# Patient Record
Sex: FEMALE | Race: WHITE | NOT HISPANIC OR LATINO | Employment: FULL TIME | URBAN - METROPOLITAN AREA
[De-identification: names, ages, dates, MRNs, and addresses within clinical notes are randomized per-mention and may not be internally consistent; named-entity substitution may affect disease eponyms.]

---

## 2017-09-18 ENCOUNTER — HOSPITAL ENCOUNTER (EMERGENCY)
Facility: HOSPITAL | Age: 27
Discharge: HOME | End: 2017-09-18
Admitting: EMERGENCY MEDICINE
Payer: COMMERCIAL

## 2017-09-18 VITALS
RESPIRATION RATE: 20 BRPM | WEIGHT: 145 LBS | SYSTOLIC BLOOD PRESSURE: 98 MMHG | TEMPERATURE: 96.9 F | HEART RATE: 93 BPM | HEIGHT: 68 IN | DIASTOLIC BLOOD PRESSURE: 53 MMHG | BODY MASS INDEX: 21.98 KG/M2 | OXYGEN SATURATION: 100 %

## 2017-09-18 DIAGNOSIS — R11.2 NAUSEA & VOMITING: ICD-10-CM

## 2017-09-18 DIAGNOSIS — O23.40 UTI (URINARY TRACT INFECTION) DURING PREGNANCY: Primary | ICD-10-CM

## 2017-09-18 LAB
ALBUMIN SERPL BCP-MCNC: 4 G/DL (ref 3.5–5)
ALP SERPL-CCNC: 23 U/L (ref 46–116)
ALT SERPL W P-5'-P-CCNC: 14 U/L (ref 12–78)
ANION GAP SERPL CALCULATED.3IONS-SCNC: 13 MMOL/L (ref 4–13)
AST SERPL W P-5'-P-CCNC: 13 U/L (ref 5–45)
B-HCG SERPL-ACNC: ABNORMAL MIU/ML
BACTERIA UR QL AUTO: ABNORMAL /HPF
BASOPHILS # BLD AUTO: 0.02 THOUSANDS/ΜL (ref 0–0.1)
BASOPHILS NFR BLD AUTO: 0 % (ref 0–1)
BILIRUB SERPL-MCNC: 0.4 MG/DL (ref 0.2–1)
BILIRUB UR QL STRIP: NEGATIVE
BUN SERPL-MCNC: 7 MG/DL (ref 5–25)
CALCIUM SERPL-MCNC: 8.8 MG/DL (ref 8.3–10.1)
CHLORIDE SERPL-SCNC: 104 MMOL/L (ref 100–108)
CLARITY UR: CLEAR
CO2 SERPL-SCNC: 23 MMOL/L (ref 21–32)
COLOR UR: YELLOW
CREAT SERPL-MCNC: 0.43 MG/DL (ref 0.6–1.3)
EOSINOPHIL # BLD AUTO: 0.1 THOUSAND/ΜL (ref 0–0.61)
EOSINOPHIL NFR BLD AUTO: 1 % (ref 0–6)
ERYTHROCYTE [DISTWIDTH] IN BLOOD BY AUTOMATED COUNT: 12.6 % (ref 11.6–15.1)
GFR SERPL CREATININE-BSD FRML MDRD: 141 ML/MIN/1.73SQ M
GLUCOSE SERPL-MCNC: 86 MG/DL (ref 65–140)
GLUCOSE UR STRIP-MCNC: NEGATIVE MG/DL
HCT VFR BLD AUTO: 38.6 % (ref 34.8–46.1)
HGB BLD-MCNC: 13.1 G/DL (ref 11.5–15.4)
HGB UR QL STRIP.AUTO: NEGATIVE
KETONES UR STRIP-MCNC: ABNORMAL MG/DL
LEUKOCYTE ESTERASE UR QL STRIP: ABNORMAL
LYMPHOCYTES # BLD AUTO: 1.56 THOUSANDS/ΜL (ref 0.6–4.47)
LYMPHOCYTES NFR BLD AUTO: 19 % (ref 14–44)
MCH RBC QN AUTO: 29.6 PG (ref 26.8–34.3)
MCHC RBC AUTO-ENTMCNC: 33.9 G/DL (ref 31.4–37.4)
MCV RBC AUTO: 87 FL (ref 82–98)
MONOCYTES # BLD AUTO: 0.52 THOUSAND/ΜL (ref 0.17–1.22)
MONOCYTES NFR BLD AUTO: 6 % (ref 4–12)
NEUTROPHILS # BLD AUTO: 6.01 THOUSANDS/ΜL (ref 1.85–7.62)
NEUTS SEG NFR BLD AUTO: 74 % (ref 43–75)
NITRITE UR QL STRIP: NEGATIVE
NON-SQ EPI CELLS URNS QL MICRO: ABNORMAL /HPF
PH UR STRIP.AUTO: 6 [PH] (ref 4.5–8)
PLATELET # BLD AUTO: 167 THOUSANDS/UL (ref 149–390)
PMV BLD AUTO: 10.9 FL (ref 8.9–12.7)
POTASSIUM SERPL-SCNC: 3.8 MMOL/L (ref 3.5–5.3)
PROT SERPL-MCNC: 7.5 G/DL (ref 6.4–8.2)
PROT UR STRIP-MCNC: NEGATIVE MG/DL
RBC # BLD AUTO: 4.42 MILLION/UL (ref 3.81–5.12)
RBC #/AREA URNS AUTO: ABNORMAL /HPF
SODIUM SERPL-SCNC: 140 MMOL/L (ref 136–145)
SP GR UR STRIP.AUTO: 1.02 (ref 1–1.03)
TSH SERPL DL<=0.05 MIU/L-ACNC: 1.94 UIU/ML (ref 0.36–3.74)
UROBILINOGEN UR QL STRIP.AUTO: 0.2 E.U./DL
WBC # BLD AUTO: 8.21 THOUSAND/UL (ref 4.31–10.16)
WBC #/AREA URNS AUTO: ABNORMAL /HPF

## 2017-09-18 PROCEDURE — 96374 THER/PROPH/DIAG INJ IV PUSH: CPT

## 2017-09-18 PROCEDURE — 84702 CHORIONIC GONADOTROPIN TEST: CPT | Performed by: PHYSICIAN ASSISTANT

## 2017-09-18 PROCEDURE — 84443 ASSAY THYROID STIM HORMONE: CPT | Performed by: PHYSICIAN ASSISTANT

## 2017-09-18 PROCEDURE — 36415 COLL VENOUS BLD VENIPUNCTURE: CPT | Performed by: PHYSICIAN ASSISTANT

## 2017-09-18 PROCEDURE — 85025 COMPLETE CBC W/AUTO DIFF WBC: CPT | Performed by: PHYSICIAN ASSISTANT

## 2017-09-18 PROCEDURE — 81001 URINALYSIS AUTO W/SCOPE: CPT | Performed by: PHYSICIAN ASSISTANT

## 2017-09-18 PROCEDURE — 80053 COMPREHEN METABOLIC PANEL: CPT | Performed by: PHYSICIAN ASSISTANT

## 2017-09-18 PROCEDURE — 99284 EMERGENCY DEPT VISIT MOD MDM: CPT

## 2017-09-18 PROCEDURE — 96361 HYDRATE IV INFUSION ADD-ON: CPT

## 2017-09-18 RX ORDER — CEPHALEXIN 500 MG/1
500 CAPSULE ORAL 2 TIMES DAILY
Qty: 9 CAPSULE | Refills: 0 | Status: SHIPPED | OUTPATIENT
Start: 2017-09-18 | End: 2017-09-23

## 2017-09-18 RX ORDER — METOCLOPRAMIDE HYDROCHLORIDE 5 MG/ML
10 INJECTION INTRAMUSCULAR; INTRAVENOUS ONCE
Status: COMPLETED | OUTPATIENT
Start: 2017-09-18 | End: 2017-09-18

## 2017-09-18 RX ORDER — CEPHALEXIN 250 MG/1
500 CAPSULE ORAL ONCE
Status: COMPLETED | OUTPATIENT
Start: 2017-09-18 | End: 2017-09-18

## 2017-09-18 RX ORDER — DOXYLAMINE SUCCINATE AND PYRIDOXINE HYDROCHLORIDE, DELAYED RELEASE TABLETS 10 MG/10 MG 10; 10 MG/1; MG/1
1 TABLET, DELAYED RELEASE ORAL 2 TIMES DAILY
Qty: 14 TABLET | Refills: 0 | Status: SHIPPED | OUTPATIENT
Start: 2017-09-18 | End: 2017-09-25

## 2017-09-18 RX ADMIN — CEPHALEXIN 500 MG: 250 CAPSULE ORAL at 15:00

## 2017-09-18 RX ADMIN — METOCLOPRAMIDE 10 MG: 5 INJECTION, SOLUTION INTRAMUSCULAR; INTRAVENOUS at 13:28

## 2017-09-18 RX ADMIN — SODIUM CHLORIDE 1000 ML: 0.9 INJECTION, SOLUTION INTRAVENOUS at 13:28

## 2017-09-20 ENCOUNTER — GENERIC CONVERSION - ENCOUNTER (OUTPATIENT)
Dept: OTHER | Facility: OTHER | Age: 27
End: 2017-09-20

## 2017-09-25 ENCOUNTER — HOSPITAL ENCOUNTER (OUTPATIENT)
Dept: INFUSION CENTER | Facility: CLINIC | Age: 27
Discharge: HOME | End: 2017-09-25
Payer: COMMERCIAL

## 2017-09-25 ENCOUNTER — ALLSCRIPTS OFFICE VISIT (OUTPATIENT)
Dept: OTHER | Facility: OTHER | Age: 27
End: 2017-09-25

## 2017-09-25 VITALS
DIASTOLIC BLOOD PRESSURE: 66 MMHG | TEMPERATURE: 98.3 F | SYSTOLIC BLOOD PRESSURE: 117 MMHG | HEART RATE: 91 BPM | RESPIRATION RATE: 18 BRPM

## 2017-09-25 PROCEDURE — 96361 HYDRATE IV INFUSION ADD-ON: CPT

## 2017-09-25 PROCEDURE — 96365 THER/PROPH/DIAG IV INF INIT: CPT

## 2017-09-25 RX ORDER — ONDANSETRON 4 MG/1
4 TABLET, FILM COATED ORAL ONCE
Status: DISCONTINUED | OUTPATIENT
Start: 2017-09-25 | End: 2017-09-25 | Stop reason: CLARIF

## 2017-09-25 RX ADMIN — ONDANSETRON: 2 INJECTION INTRAMUSCULAR; INTRAVENOUS at 11:10

## 2017-09-25 RX ADMIN — SODIUM CHLORIDE, SODIUM LACTATE, POTASSIUM CHLORIDE, AND CALCIUM CHLORIDE 1000 ML: .6; .31; .03; .02 INJECTION, SOLUTION INTRAVENOUS at 12:40

## 2017-09-25 NOTE — PROGRESS NOTES
Hydration and antiemetic given as ordered  Pt offers no complaints  Pt has no future appointments at this time

## 2017-09-26 ENCOUNTER — GENERIC CONVERSION - ENCOUNTER (OUTPATIENT)
Dept: OTHER | Facility: OTHER | Age: 27
End: 2017-09-26

## 2017-09-28 ENCOUNTER — GENERIC CONVERSION - ENCOUNTER (OUTPATIENT)
Dept: OTHER | Facility: OTHER | Age: 27
End: 2017-09-28

## 2017-09-29 ENCOUNTER — GENERIC CONVERSION - ENCOUNTER (OUTPATIENT)
Dept: OTHER | Facility: OTHER | Age: 27
End: 2017-09-29

## 2017-10-02 ENCOUNTER — GENERIC CONVERSION - ENCOUNTER (OUTPATIENT)
Dept: OTHER | Facility: OTHER | Age: 27
End: 2017-10-02

## 2017-10-03 ENCOUNTER — GENERIC CONVERSION - ENCOUNTER (OUTPATIENT)
Dept: OTHER | Facility: OTHER | Age: 27
End: 2017-10-03

## 2017-10-03 ENCOUNTER — ALLSCRIPTS OFFICE VISIT (OUTPATIENT)
Dept: OTHER | Facility: OTHER | Age: 27
End: 2017-10-03

## 2017-10-03 LAB
BACTERIA UR QL AUTO: NEGATIVE
CLUE CELL (HISTORICAL): NEGATIVE
HYPHAL YEAST (HISTORICAL): NORMAL
KOH PREP (HISTORICAL): NEGATIVE
PH UR STRIP.AUTO: 4 [PH]
TRICHOMONAS (HISTORICAL): NEGATIVE

## 2017-10-06 ENCOUNTER — GENERIC CONVERSION - ENCOUNTER (OUTPATIENT)
Dept: OTHER | Facility: OTHER | Age: 27
End: 2017-10-06

## 2017-10-07 LAB
ABO GROUP BLD: NORMAL
BASOPHILS # BLD AUTO: 0 %
BASOPHILS # BLD AUTO: 0 X10E3/UL (ref 0–0.2)
BLD GP AB SCN SERPL QL: NEGATIVE
DEPRECATED RDW RBC AUTO: 13.6 % (ref 12.3–15.4)
EOSINOPHIL # BLD AUTO: 0.1 X10E3/UL (ref 0–0.4)
EOSINOPHIL # BLD AUTO: 1 %
HCT VFR BLD AUTO: 35.5 % (ref 34–46.6)
HEPATITIS B SURFACE ANTIGEN (HISTORICAL): NEGATIVE
HGB BLD-MCNC: 12.3 G/DL (ref 11.1–15.9)
HIV-1/2 AB-AG (HISTORICAL): NON REACTIVE
IMM.GRANULOCYTES (CD4/8) (HISTORICAL): 0 %
IMM.GRANULOCYTES (CD4/8) (HISTORICAL): 0 X10E3/UL (ref 0–0.1)
LYMPHOCYTES # BLD AUTO: 1.5 X10E3/UL (ref 0.7–3.1)
LYMPHOCYTES # BLD AUTO: 23 %
MCH RBC QN AUTO: 30.2 PG (ref 26.6–33)
MCHC RBC AUTO-ENTMCNC: 34.6 G/DL (ref 31.5–35.7)
MCV RBC AUTO: 87 FL (ref 79–97)
MONOCYTES # BLD AUTO: 0.4 X10E3/UL (ref 0.1–0.9)
MONOCYTES (HISTORICAL): 6 %
NEUTROPHILS # BLD AUTO: 4.4 X10E3/UL (ref 1.4–7)
NEUTROPHILS # BLD AUTO: 70 %
PLATELET # BLD AUTO: 165 X10E3/UL (ref 150–379)
RBC (HISTORICAL): 4.07 X10E6/UL (ref 3.77–5.28)
RH BLD: POSITIVE
RPR SCREEN (HISTORICAL): NON REACTIVE
RUBELLA, IGG (HISTORICAL): 2.68 INDEX
WBC # BLD AUTO: 6.4 X10E3/UL (ref 3.4–10.8)

## 2017-10-09 ENCOUNTER — GENERIC CONVERSION - ENCOUNTER (OUTPATIENT)
Dept: OTHER | Facility: OTHER | Age: 27
End: 2017-10-09

## 2017-10-09 ENCOUNTER — HOSPITAL ENCOUNTER (EMERGENCY)
Facility: HOSPITAL | Age: 27
Discharge: HOME | End: 2017-10-09
Payer: COMMERCIAL

## 2017-10-09 VITALS
SYSTOLIC BLOOD PRESSURE: 98 MMHG | BODY MASS INDEX: 22.05 KG/M2 | TEMPERATURE: 98 F | RESPIRATION RATE: 16 BRPM | HEART RATE: 81 BPM | OXYGEN SATURATION: 98 % | DIASTOLIC BLOOD PRESSURE: 52 MMHG | WEIGHT: 145 LBS

## 2017-10-09 DIAGNOSIS — O21.0 HYPEREMESIS GRAVIDARUM: Primary | ICD-10-CM

## 2017-10-09 LAB
ALBUMIN SERPL BCP-MCNC: 4 G/DL (ref 3.5–5)
ALP SERPL-CCNC: 22 U/L (ref 46–116)
ALT SERPL W P-5'-P-CCNC: 21 U/L (ref 12–78)
AMORPH URATE CRY URNS QL MICRO: ABNORMAL /HPF
ANION GAP SERPL CALCULATED.3IONS-SCNC: 8 MMOL/L (ref 4–13)
AST SERPL W P-5'-P-CCNC: 14 U/L (ref 5–45)
BACTERIA UR QL AUTO: ABNORMAL /HPF
BASOPHILS # BLD AUTO: 0.03 THOUSANDS/ΜL (ref 0–0.1)
BASOPHILS NFR BLD AUTO: 0 % (ref 0–1)
BILIRUB SERPL-MCNC: 0.3 MG/DL (ref 0.2–1)
BILIRUB UR QL STRIP: NEGATIVE
BUN SERPL-MCNC: 8 MG/DL (ref 5–25)
CALCIUM SERPL-MCNC: 8.9 MG/DL (ref 8.3–10.1)
CHLORIDE SERPL-SCNC: 100 MMOL/L (ref 100–108)
CLARITY UR: ABNORMAL
CO2 SERPL-SCNC: 27 MMOL/L (ref 21–32)
COLOR UR: YELLOW
CREAT SERPL-MCNC: 0.54 MG/DL (ref 0.6–1.3)
CULTURE RESULT (HISTORICAL): NORMAL
EOSINOPHIL # BLD AUTO: 0.1 THOUSAND/ΜL (ref 0–0.61)
EOSINOPHIL NFR BLD AUTO: 1 % (ref 0–6)
ERYTHROCYTE [DISTWIDTH] IN BLOOD BY AUTOMATED COUNT: 12.8 % (ref 11.6–15.1)
FINE GRAN CASTS URNS QL MICRO: ABNORMAL /LPF
GFR SERPL CREATININE-BSD FRML MDRD: 131 ML/MIN/1.73SQ M
GLUCOSE SERPL-MCNC: 78 MG/DL (ref 65–140)
GLUCOSE UR STRIP-MCNC: NEGATIVE MG/DL
HCT VFR BLD AUTO: 37.3 % (ref 34.8–46.1)
HGB BLD-MCNC: 12.8 G/DL (ref 11.5–15.4)
HGB UR QL STRIP.AUTO: NEGATIVE
KETONES UR STRIP-MCNC: ABNORMAL MG/DL
LEUKOCYTE ESTERASE UR QL STRIP: NEGATIVE
LYMPHOCYTES # BLD AUTO: 1.64 THOUSANDS/ΜL (ref 0.6–4.47)
LYMPHOCYTES NFR BLD AUTO: 24 % (ref 14–44)
MAGNESIUM SERPL-MCNC: 2 MG/DL (ref 1.6–2.6)
MCH RBC QN AUTO: 30.1 PG (ref 26.8–34.3)
MCHC RBC AUTO-ENTMCNC: 34.3 G/DL (ref 31.4–37.4)
MCV RBC AUTO: 88 FL (ref 82–98)
MISCELLANEOUS LAB TEST RESULT (HISTORICAL): NORMAL
MONOCYTES # BLD AUTO: 0.55 THOUSAND/ΜL (ref 0.17–1.22)
MONOCYTES NFR BLD AUTO: 8 % (ref 4–12)
MUCOUS THREADS UR QL AUTO: ABNORMAL
NEUTROPHILS # BLD AUTO: 4.67 THOUSANDS/ΜL (ref 1.85–7.62)
NEUTS SEG NFR BLD AUTO: 67 % (ref 43–75)
NITRITE UR QL STRIP: NEGATIVE
NON-SQ EPI CELLS URNS QL MICRO: ABNORMAL /HPF
PH UR STRIP.AUTO: 6.5 [PH] (ref 4.5–8)
PLATELET # BLD AUTO: 162 THOUSANDS/UL (ref 149–390)
PMV BLD AUTO: 11.7 FL (ref 8.9–12.7)
POTASSIUM SERPL-SCNC: 3.5 MMOL/L (ref 3.5–5.3)
PROT SERPL-MCNC: 7.6 G/DL (ref 6.4–8.2)
PROT UR STRIP-MCNC: ABNORMAL MG/DL
RBC # BLD AUTO: 4.25 MILLION/UL (ref 3.81–5.12)
RBC #/AREA URNS AUTO: ABNORMAL /HPF
SODIUM SERPL-SCNC: 135 MMOL/L (ref 136–145)
SP GR UR STRIP.AUTO: 1.01 (ref 1–1.03)
UROBILINOGEN UR QL STRIP.AUTO: 0.2 E.U./DL
WBC # BLD AUTO: 6.99 THOUSAND/UL (ref 4.31–10.16)
WBC #/AREA URNS AUTO: ABNORMAL /HPF

## 2017-10-09 PROCEDURE — 99284 EMERGENCY DEPT VISIT MOD MDM: CPT

## 2017-10-09 PROCEDURE — 96361 HYDRATE IV INFUSION ADD-ON: CPT

## 2017-10-09 PROCEDURE — 83735 ASSAY OF MAGNESIUM: CPT | Performed by: PHYSICIAN ASSISTANT

## 2017-10-09 PROCEDURE — 36415 COLL VENOUS BLD VENIPUNCTURE: CPT | Performed by: PHYSICIAN ASSISTANT

## 2017-10-09 PROCEDURE — 81001 URINALYSIS AUTO W/SCOPE: CPT | Performed by: PHYSICIAN ASSISTANT

## 2017-10-09 PROCEDURE — 85025 COMPLETE CBC W/AUTO DIFF WBC: CPT | Performed by: PHYSICIAN ASSISTANT

## 2017-10-09 PROCEDURE — 80053 COMPREHEN METABOLIC PANEL: CPT | Performed by: PHYSICIAN ASSISTANT

## 2017-10-09 PROCEDURE — 96374 THER/PROPH/DIAG INJ IV PUSH: CPT

## 2017-10-09 RX ORDER — PROMETHAZINE HYDROCHLORIDE 25 MG/1
25 SUPPOSITORY RECTAL EVERY 6 HOURS PRN
Qty: 20 EACH | Refills: 0 | Status: ON HOLD | OUTPATIENT
Start: 2017-10-09 | End: 2018-04-15 | Stop reason: ALTCHOICE

## 2017-10-09 RX ORDER — ONDANSETRON HYDROCHLORIDE 8 MG/1
TABLET, FILM COATED ORAL EVERY 12 HOURS PRN
COMMUNITY

## 2017-10-09 RX ORDER — METOCLOPRAMIDE HYDROCHLORIDE 5 MG/ML
10 INJECTION INTRAMUSCULAR; INTRAVENOUS ONCE
Status: COMPLETED | OUTPATIENT
Start: 2017-10-09 | End: 2017-10-09

## 2017-10-09 RX ADMIN — METOCLOPRAMIDE 10 MG: 5 INJECTION, SOLUTION INTRAMUSCULAR; INTRAVENOUS at 17:30

## 2017-10-09 RX ADMIN — SODIUM CHLORIDE 1000 ML: 0.9 INJECTION, SOLUTION INTRAVENOUS at 16:15

## 2017-10-09 NOTE — DISCHARGE INSTRUCTIONS
Hyperemesis Gravidarum   WHAT YOU NEED TO KNOW:   Hyperemesis gravidarum is a severe form of nausea and vomiting that happens during pregnancy  Hyperemesis is more severe than morning sickness  It may cause you to have nausea or vomiting all day for many days  It may also keep you from getting enough food and liquid  DISCHARGE INSTRUCTIONS:   Return to the emergency department if:   · You have signs of severe dehydration including little to no urine and dry mouth or lips  · You have severe stomach pain  · You feel too weak or dizzy to stand up  · You see blood in your vomit or bowel movements  Contact your healthcare provider if:   · You cannot keep any food or liquid down  · You are losing weight  · You have a fever  · You have questions or concerns about your condition or care  Medicines:   · Medicines, vitamins, or supplements  may be given to help decrease nausea and vomiting  · Take your medicine as directed  Contact your healthcare provider if you think your medicine is not helping or if you have side effects  Tell him of her if you are allergic to any medicine  Keep a list of the medicines, vitamins, and herbs you take  Include the amounts, and when and why you take them  Bring the list or the pill bottles to follow-up visits  Carry your medicine list with you in case of an emergency  Manage your symptoms:   · Eat small amounts of food every 1 to 2 hours  Some examples of good foods to eat include broth, toast, crackers, fruit, eggs, gelatin, or cottage cheese  Do not eat spicy or high-fat foods  Foods and drinks with ginger, such as ginger ale, may help to decrease nausea and vomiting  · Drink liquids as directed  You may need to drink small amounts of liquid often to prevent dehydration  Ask how much liquid to drink each day and which liquids are best for you  · Rest when you need to    Start activity slowly and work up to your usual routine as you start to feel better  · Medicines, vitamins, or supplements  may be given to help decrease nausea and vomiting  · Weigh yourself daily if directed by your healthcare provider  You may need to keep a record of your daily weights for your healthcare provider  He may want to make sure you are not losing too much weight  Follow up with your healthcare provider as directed:  Write down your questions so you remember to ask them during your visits  © 2017 [address] [employer] Information is for End User's use only and may not be sold, redistributed or otherwise used for commercial purposes  All illustrations and images included in CareNotes® are the copyrighted property of A D A M , Inc  or Gil Meraz  The above information is an  only  It is not intended as medical advice for individual conditions or treatments  Talk to your doctor, nurse or pharmacist before following any medical regimen to see if it is safe and effective for you

## 2017-10-09 NOTE — ED PROVIDER NOTES
History  Chief Complaint   Patient presents with    Hyperemesis Gravidarum     pt stated that Monalisa Steel is not working, OB sent her to the ER for fluids     67-year-old female presents for evaluation of emesis  Patient is currently 12 weeks pregnant  This is her 1st pregnancy  No other complications other than the persistent nausea and vomiting  Patient was seen here 3 weeks ago for the same complaint  She says that she has been following up with her obstetrician/gynecologist   She says there has been no change  Initially she was placed on 8 milligrams 3 times a day of dissolvable Zofran  This actually helped with her symptoms  She says that her insurance however does not cover for enough tablets and when she was switched to the pill form of the Zofran it no longer is working  She called her obstetrician/gynecologist office and they told her to come to the emergency department for concern for dehydration or electrolyte abnormalities  She denies any other symptoms such as fevers, chills, headaches, visual changes, chest pain, shortness of breath, abdominal pain, diarrhea, melena, hematochezia, dysuria, hematuria, urgency or frequency  She is otherwise healthy with no other complaints at this time  Prior to Admission Medications   Prescriptions Last Dose Informant Patient Reported? Taking? Prenatal Vit-Fe Fumarate-FA (PRENATAL 1+1 PO) 10/9/2017 at Unknown time  Yes Yes   Sig: Take 1 tablet by mouth daily   ondansetron (ZOFRAN) 8 mg tablet   Yes Yes   Sig: Take by mouth every 12 (twelve) hours as needed for nausea or vomiting      Facility-Administered Medications: None       History reviewed  No pertinent past medical history  Past Surgical History:   Procedure Laterality Date    NASAL SINUS SURGERY      WISDOM TOOTH EXTRACTION      LEFT LOWER REMOVED       History reviewed  No pertinent family history  I have reviewed and agree with the history as documented      Social History   Substance Use Topics    Smoking status: Never Smoker    Smokeless tobacco: Never Used    Alcohol use No        Review of Systems   Constitutional: Negative for activity change, appetite change, chills, fatigue and fever  HENT: Negative for congestion, postnasal drip, rhinorrhea, sore throat, trouble swallowing and voice change  Eyes: Negative for photophobia and visual disturbance  Respiratory: Negative for cough and shortness of breath  Cardiovascular: Negative for chest pain  Gastrointestinal: Positive for nausea and vomiting  Negative for abdominal pain, constipation and diarrhea  Endocrine: Negative for cold intolerance and heat intolerance  Genitourinary: Negative for dysuria, flank pain, frequency, hematuria and urgency  Musculoskeletal: Negative for back pain, gait problem, neck pain and neck stiffness  Skin: Negative for rash and wound  Allergic/Immunologic: Negative for food allergies  Neurological: Positive for weakness  Negative for dizziness, light-headedness, numbness and headaches  Hematological: Negative for adenopathy  Physical Exam  ED Triage Vitals   Temperature Pulse Respirations Blood Pressure SpO2   10/09/17 1616 10/09/17 1616 10/09/17 1616 10/09/17 1616 10/09/17 1617   98 °F (36 7 °C) 85 16 105/68 100 %      Temp Source Heart Rate Source Patient Position - Orthostatic VS BP Location FiO2 (%)   10/09/17 1616 10/09/17 1616 10/09/17 1616 10/09/17 1616 --   Temporal Monitor Sitting Right arm       Pain Score       10/09/17 1616       No Pain           Physical Exam   Constitutional: She is oriented to person, place, and time  She appears well-developed and well-nourished  No distress  HENT:   Head: Normocephalic and atraumatic  Eyes: Conjunctivae and EOM are normal  Pupils are equal, round, and reactive to light  Neck: Normal range of motion  Neck supple  Cardiovascular: Normal rate, regular rhythm, normal heart sounds and intact distal pulses    Exam reveals no gallop and no friction rub  No murmur heard  Pulmonary/Chest: Effort normal and breath sounds normal  No respiratory distress  She has no wheezes  She has no rales  She exhibits no tenderness  Abdominal: Soft  There is no tenderness  Musculoskeletal: Normal range of motion  She exhibits no edema, tenderness or deformity  Neurological: She is alert and oriented to person, place, and time  Skin: Skin is warm and dry  Capillary refill takes less than 2 seconds  She is not diaphoretic  No pallor  Psychiatric: She has a normal mood and affect  Her behavior is normal  Judgment and thought content normal    Vitals reviewed  ED Medications  Medications   sodium chloride 0 9 % bolus 1,000 mL (0 mL Intravenous Stopped 10/9/17 1728)   metoclopramide (REGLAN) injection 10 mg (10 mg Intravenous Given 10/9/17 1730)       Diagnostic Studies  Labs Reviewed   COMPREHENSIVE METABOLIC PANEL - Abnormal        Result Value Ref Range Status    Sodium 135 (*) 136 - 145 mmol/L Final    Creatinine 0 54 (*) 0 60 - 1 30 mg/dL Final    Comment: Standardized to IDMS reference method    Alkaline Phosphatase 22 (*) 46 - 116 U/L Final    Potassium 3 5  3 5 - 5 3 mmol/L Final    Chloride 100  100 - 108 mmol/L Final    CO2 27  21 - 32 mmol/L Final    Anion Gap 8  4 - 13 mmol/L Final    BUN 8  5 - 25 mg/dL Final    Glucose 78  65 - 140 mg/dL Final    Comment:   If the patient is fasting, the ADA then defines impaired fasting glucose as > 100 mg/dL and diabetes as > or equal to 123 mg/dL  Specimen collection should occur prior to Sulfasalazine administration due to the potential for falsely depressed results  Specimen collection should occur prior to Sulfapyridine administration due to the potential for falsely elevated results  Calcium 8 9  8 3 - 10 1 mg/dL Final    AST 14  5 - 45 U/L Final    Comment:   Specimen collection should occur prior to Sulfasalazine administration due to the potential for falsely depressed results  ALT 21  12 - 78 U/L Final    Comment:   Specimen collection should occur prior to Sulfasalazine administration due to the potential for falsely depressed results  Total Protein 7 6  6 4 - 8 2 g/dL Final    Albumin 4 0  3 5 - 5 0 g/dL Final    Total Bilirubin 0 30  0 20 - 1 00 mg/dL Final    eGFR 131  ml/min/1 73sq m Final    Narrative:     National Kidney Disease Education Program recommendations are as follows:  GFR calculation is accurate only with a steady state creatinine  Chronic Kidney disease less than 60 ml/min/1 73 sq  meters  Kidney failure less than 15 ml/min/1 73 sq  meters     UA W REFLEX TO MICROSCOPIC WITH REFLEX TO CULTURE - Abnormal     Protein, UA 30 (1+) (*) Negative mg/dl Final    Ketones, UA Trace (*) Negative mg/dl Final    Color, UA Yellow   Final    Clarity, UA Cloudy   Final    Specific [place], UA 1 015  1 003 - 1 030 Final    pH, UA 6 5  4 5 - 8 0 Final    Leukocytes, UA Negative  Negative Final    Nitrite, UA Negative  Negative Final    Glucose, UA Negative  Negative mg/dl Final    Urobilinogen, UA 0 2  0 2, 1 0 E U /dl E U /dl Final    Bilirubin, UA Negative  Negative Final    Blood, UA Negative  Negative Final   URINE MICROSCOPIC - Abnormal     WBC, UA 0-1 (*) None Seen, 0-5, 5-55, 5-65 /hpf Final    RBC, UA None Seen  None Seen, 0-5 /hpf Final    Epithelial Cells Occasional  None Seen, Occasional /hpf Final    Bacteria, UA Occasional  None Seen, Occasional /hpf Final    Fine granular casts 1-2  /lpf Final    AMORPH URATES Moderate  /hpf Final    MUCOUS THREADS Occasional  Occasional, Moderate, Innumerable Final   CBC AND DIFFERENTIAL - Normal    WBC 6 99  4 31 - 10 16 Thousand/uL Final    RBC 4 25  3 81 - 5 12 Million/uL Final    Hemoglobin 12 8  11 5 - 15 4 g/dL Final    Hematocrit 37 3  34 8 - 46 1 % Final    MCV 88  82 - 98 fL Final    MCH 30 1  26 8 - 34 3 pg Final    MCHC 34 3  31 4 - 37 4 g/dL Final    RDW 12 8  11 6 - 15 1 % Final    MPV 11 7  8 9 - 12 7 fL Final Platelets 249  994 - 390 Thousands/uL Final    Neutrophils Relative 67  43 - 75 % Final    Lymphocytes Relative 24  14 - 44 % Final    Monocytes Relative 8  4 - 12 % Final    Eosinophils Relative 1  0 - 6 % Final    Basophils Relative 0  0 - 1 % Final    Neutrophils Absolute 4 67  1 85 - 7 62 Thousands/µL Final    Lymphocytes Absolute 1 64  0 60 - 4 47 Thousands/µL Final    Monocytes Absolute 0 55  0 17 - 1 22 Thousand/µL Final    Eosinophils Absolute 0 10  0 00 - 0 61 Thousand/µL Final    Basophils Absolute 0 03  0 00 - 0 10 Thousands/µL Final   MAGNESIUM - Normal    Magnesium 2 0  1 6 - 2 6 mg/dL Final       No orders to display       Procedures  Procedures      Phone Contacts  ED Phone Contact    ED Course  ED Course                                MDM  Number of Diagnoses or Management Options  Hyperemesis gravidarum:   Diagnosis management comments: Differential dx includes but is not limited to hyperemesis gravidarum, UTI, gastritis  At this time pt without abdominal pain  12 weeks pregnant  No spotting or complications  She is here for nausea and vomiting  Sent by Ob/Gyn due to concern for dehydration, electrolyte abnormality  Action: CBC, CMP, UA, fluids, reglan  Results: normal labs  Reglan provided some relief  Pt tolerating PO  Plan: d/c home at this time with phenergan suppository and follow up with her ob/gyn for further intervention and management  Amount and/or Complexity of Data Reviewed  Clinical lab tests: ordered and reviewed  Review and summarize past medical records: yes  Discuss the patient with other providers: yes  Independent visualization of images, tracings, or specimens: yes      CritCare Time    Disposition  Final diagnoses:   Hyperemesis gravidarum     ED Disposition     ED Disposition Condition Comment    Discharge  [facility] discharge to home/self care      Condition at discharge: Good        Follow-up Information     Follow up With Specialties Details Why Contact Info Additional Information    [facility] Emergency Department Emergency Medicine Go to If symptoms worsen [address] [facility]   [address], [place] [address], [place], [place], [address]    Your Ob/Gyn  Call tomorrow for follow up           Patient's Medications   Discharge Prescriptions    PROMETHAZINE (PHENERGAN) 25 MG SUPPOSITORY    Insert 1 suppository into the rectum every 6 (six) hours as needed for nausea or vomiting       Start Date: 10/9/2017 End Date: --       Order Dose: 25 mg       Quantity: 20 each    Refills: 0     No discharge procedures on file      ED Provider  Electronically Signed by       Francisca Doty PA-C  10/09/17 2069

## 2017-10-10 ENCOUNTER — GENERIC CONVERSION - ENCOUNTER (OUTPATIENT)
Dept: OTHER | Facility: OTHER | Age: 27
End: 2017-10-10

## 2017-10-12 LAB
ADEQUACY: (HISTORICAL): NORMAL
CHLAMYDIA DFA, NAA OR PCR (HISTORICAL): NEGATIVE
CLINICIAN PROVIDIED ICD 9 OR 10 (HISTORICAL): NORMAL
COMMENT (HISTORICAL): NORMAL
COMMENT (HISTORICAL): NORMAL
CYSTIC FIBROSIS SCREEN (HISTORICAL): NORMAL
DIAGNOSIS (HISTORICAL): NORMAL
GC, DNA PROB (HISTORICAL): NEGATIVE
HPV HIGH RISK (HISTORICAL): NEGATIVE
NOTE: (HISTORICAL): NORMAL
PDF IMAGE (HISTORICAL): NORMAL
PERFORMED BY (HISTORICAL): NORMAL
TEST INFORMATION (HISTORICAL): NORMAL

## 2017-10-17 ENCOUNTER — GENERIC CONVERSION - ENCOUNTER (OUTPATIENT)
Dept: OTHER | Facility: OTHER | Age: 27
End: 2017-10-17

## 2017-10-27 NOTE — PROGRESS NOTES
Assessment  1  Hyperemesis of pregnancy (643 00) (O21 0)    Plan  Hyperemesis of pregnancy    · Ondansetron 4 MG Oral Tablet Disintegrating; TAKE 1 TABLET EVERY 6 TO 8  HOURS AS NEEDED   Rx By: Jessica Decker; Dispense: 8 Days ; #:1 Tablet Disintegrating; Refill: 2;For: Hyperemesis of pregnancy; DAMEON = N; Sent To: [facility]   · Follow-up visit in 2 weeks Evaluation and Treatment  Follow-up  Status: Hold For -  Scheduling  Requested for: [id number]   Ordered; For: Hyperemesis of pregnancy; Ordered By: Jessica Decker Performed:  Due: [id number]    Discussion/Summary  Discussion Summary:   1  Hyperemesis? patient with LMP of 7/16/17 placing her at 10 weeks gestation, confirmed by ultrasound at [facility] at 8 5 weeks on 9/15 with ENEDELIA of 4/22/17  The patient had a picture which she showed me with apparently viable pregnancy  She has had a weight loss from 153 down to 143  She called her primary OB/GYN doctor through [facility] and the [facility] office and she was not happy with their lack of response to her condition  She has been seen in the [facility] ER and the [facility] ER  She did appear to be pale and weak today  She has had lack of response to Diclegis as prescribed previously at the [place] [address] ER  She was counseled about Zofran and prescription for 4 mg orally dissolving tablets #30 to take one tablet every 6-8 hours time refill 2 was sent to local pharmacy  She will call with any significant persistent symptoms, particularly with protracted nausea and vomiting associated with lightheadedness or dizziness  Additionally, she was sent to the infusion center today for IV fluids, 1 5 L of LR and 1 L of normal saline with Zofran 4 mg to see if this will help her  We will keep in contact with her closely  Prenatal?patient to follow-up next few weeks for first prenatal visit  Possible UTI? patient was treated in the ER for UTI  She was encouraged to finish the antibiotics as prescribed  will follow-up in 2-3 weeks' time for first prenatal visit  Counseling Documentation With Imm: total time of encounter was 40 minutes-- and-- 40 minutes was spent counseling  Goals and Barriers: The patient has the current Goals: Reviewed  The patent has the current Barriers: None  Patient's Capacity to Self-Care: Patient is able to Self-Care  Medication SE Review and Pt Understands Tx: Possible side effects of new medications were reviewed with the patient/guardian today  The treatment plan was reviewed with the patient/guardian  The patient/guardian understands and agrees with the treatment plan   Self Referrals:   Self Referrals: Yes      Chief Complaint  Chief Complaint Free Text Note Form: NEW OB, ENEDELIA 04/22/2017, PT C/O EXCESSIVE VOMITING      History of Present Illness  HPI: The patient was seen today for new patient evaluation  Please see assessment and plan for details  Review of Systems  Focused-Female:   Constitutional: No fever, no chills, feels well, no tiredness, no recent weight gain or loss  ENT: no ear ache, no loss of hearing, no nosebleeds or nasal discharge, no sore throat or hoarseness  Cardiovascular: no complaints of slow or fast heart rate, no chest pain, no palpitations, no leg claudication or lower extremity edema  Respiratory: no complaints of shortness of breath, no wheezing, no dyspnea on exertion, no orthopnea or PND  Breasts: no complaints of breast pain, breast lump or nipple discharge  Gastrointestinal: nausea-- and-- vomiting, but-- as noted in HPI  Genitourinary: no complaints of dysuria, no incontinence, no pelvic pain, no dysmenorrhea, no vaginal discharge or abnormal vaginal bleeding  Musculoskeletal: no complaints of arthralgia, no myalgia, no joint swelling or stiffness, no limb pain or swelling  Integumentary: no complaints of skin rash or lesion, no itching or dry skin, no skin wounds     Neurological: no complaints of headache, no confusion, no numbness or tingling, no dizziness or fainting  ROS Reviewed:   ROS reviewed  Active Problems  1  Hyperemesis of pregnancy (643 00) (O21 0)    Past Medical History  1  History of  1  Active Problems And Past Medical History Reviewed: The active problems and past medical history were reviewed and updated today  Surgical History  1  History of Oral Surgery Tooth Extraction Frannie Tooth   2  History of Sinus Surgery  Surgical History Reviewed: The surgical history was reviewed and updated today  Family History  Family History Reviewed: The family history was reviewed and updated today  Social History  Social History Reviewed: The social history was reviewed and updated today  The social history was reviewed and is unchanged  Current Meds   1  Diclegis 10-10 MG Oral Tablet Delayed Release; Therapy: (Recorded:[id number]) to Recorded   2  Prenatal TABS; Therapy: (Recorded:80Nos6524) to Recorded  Medication List Reviewed: The medication list was reviewed and updated today  Allergies  1  No Known Drug Allergies    Vitals  Vital Signs    Recorded: [id number] 71:90YS   Systolic 98, LUE, Sitting   Diastolic 56, LUE, Sitting   Height 5 ft 8 in   Weight 143 lb 6 oz   BMI Calculated 21 8   BSA Calculated 1 77   LMP [id number]     Physical Exam    Constitutional   General appearance: Abnormal  -- Patient somewhat pelvic and weak  Abdomen   Abdomen: Normal, non-tender, and no organomegaly noted  -- Soft, nontender     Psychiatric   Orientation to person, place, and time: Normal     Mood and affect: Normal        Signatures   Electronically signed by : TIFFANY Carter ; Sep 25 2017  1:10PM EST                       (Author)    Electronically signed by : TIFFANY Carter ; Sep 25 2017  1:13PM EST                       (Author)

## 2017-10-31 ENCOUNTER — GENERIC CONVERSION - ENCOUNTER (OUTPATIENT)
Dept: OTHER | Facility: OTHER | Age: 27
End: 2017-10-31

## 2017-11-28 ENCOUNTER — GENERIC CONVERSION - ENCOUNTER (OUTPATIENT)
Dept: OTHER | Facility: OTHER | Age: 27
End: 2017-11-28

## 2017-12-05 ENCOUNTER — ALLSCRIPTS OFFICE VISIT (OUTPATIENT)
Dept: PERINATAL CARE | Facility: CLINIC | Age: 27
End: 2017-12-05
Payer: COMMERCIAL

## 2017-12-05 ENCOUNTER — GENERIC CONVERSION - ENCOUNTER (OUTPATIENT)
Dept: OTHER | Facility: OTHER | Age: 27
End: 2017-12-05

## 2017-12-05 PROCEDURE — 76817 TRANSVAGINAL US OBSTETRIC: CPT | Performed by: OBSTETRICS & GYNECOLOGY

## 2017-12-05 PROCEDURE — 76805 OB US >/= 14 WKS SNGL FETUS: CPT | Performed by: OBSTETRICS & GYNECOLOGY

## 2017-12-22 ENCOUNTER — GENERIC CONVERSION - ENCOUNTER (OUTPATIENT)
Dept: OTHER | Facility: OTHER | Age: 27
End: 2017-12-22

## 2018-01-02 ENCOUNTER — ALLSCRIPTS OFFICE VISIT (OUTPATIENT)
Dept: OTHER | Facility: OTHER | Age: 28
End: 2018-01-02

## 2018-01-11 NOTE — MISCELLANEOUS
Message   Date: 26 Sep 2017 9:41 AM EST, Recorded By: Hallie Bateman For: Casey Del Angelast: Lenny Bradley, [place]   Phone: [phone] (Home)   Reason: Medical Complaint   Patient called for work note for appointment yesterday  Mailed per request         Active Problems    1  Hyperemesis of pregnancy (643 00) (O21 0)    Current Meds   1  Diclegis 10-10 MG Oral Tablet Delayed Release; Therapy: (Recorded:41Hxk6614) to Recorded   2  Ondansetron 4 MG Oral Tablet Disintegrating; TAKE 1 TABLET EVERY 6 TO 8 HOURS   AS NEEDED; Therapy: 09GQM0102 to 77 873 135)  Requested for: 73LWJ5142; Last   Rx:[id number] Ordered   3  Prenatal TABS; Therapy: (Recorded:93Eow1501) to Recorded    Allergies    1   No Known Drug Allergies    Signatures   Electronically signed by : Eligio Garcia, ; Sep 26 2017  9:42AM EST                       (Author)

## 2018-01-11 NOTE — MISCELLANEOUS
Message   Date: 02 Oct 2017 3:32 PM EST, Recorded By: Cecelia Jiang For: Shade Oshea, Self   Phone: [phone] (Home)   Reason: Medical Complaint   pt is on the Zofran 8 mg and is still vomiting, but not as much    she has a lot of dry heaving    pt has an apt tomorrow and will be evaluated    meanwhile advised pt to have small amounts of water frequently, suck on saltines, lemon drops            Active Problems    1  Hyperemesis of pregnancy (643 00) (O21 0)    Current Meds   1  Diclegis 10-10 MG Oral Tablet Delayed Release; Therapy: (Recorded:89Xqp0583) to Recorded   2  Ondansetron 4 MG Oral Tablet Disintegrating; TAKE 1 TABLET EVERY 6 TO 8 HOURS   AS NEEDED; Therapy: 67YYR6996 to 40-45-11-94)  Requested for: 05AQB7963; Last   Rx:[id number] Ordered   3  Ondansetron 8 MG Oral Tablet Disintegrating; 1 tab PO q 8 hrs prn; Therapy: 83DWV0653 to (Last Luis Manuel Rea)  Requested for: [id number] Ordered   4  Prenatal TABS; Therapy: (Recorded:09Pjo6610) to Recorded    Allergies    1   No Known Drug Allergies    Signatures   Electronically signed by : Dannie Sampson, ; Oct  2 2017  3:36PM EST                       (Author)

## 2018-01-11 NOTE — PROGRESS NOTES
Plan  Hyperemesis of pregnancy, Pregnancy, obstetrical care    · *1 - [address], 1sty trimester screening, Level II  ultrasound  Please do not schedule further ultrasounds or testing for this patient  Status:  Active - Retrospective Authorization  Requested for: [id number]  Care Summary provided  : Yes    Active Problems    1  Hyperemesis of pregnancy (643 00) (O21 0)    Current Meds    1  Ondansetron 4 MG Oral Tablet Disintegrating; TAKE 1 TABLET EVERY 6 TO 8 HOURS AS   NEEDED; Therapy: [id number] to 21 )  Requested for: 27XLH0252; Last   Rx:[id number] Ordered   2  Ondansetron 8 MG Oral Tablet Disintegrating; 1 tab PO q 8 hrs prn; Therapy: 26AOP9658 to (Last Rometta Lindsborg)  Requested for: [id number] Ordered    3  Diclegis 10-10 MG Oral Tablet Delayed Release; Therapy: (Donnell Baer) to Recorded   4  Prenatal TABS; Therapy: (Recorded:87Rbb0873) to Recorded    Allergies    1  No Known Drug Allergies    Results/Data  [address] Transvaginal Ultrasound OB [facility]:   Procedure: [id number] pregnant uterus real time with image documentation, fetal and maternal evaluation, first trimester (<14 weeks 0 days), transvaginal approach: single or first gestation  The study was done today in the office  Indication: EDC gestational age 6w1d with an ENEDELIA of 4/22/2018 by LMP weeks  Exam indication: New OB  Findings:   Number of gestational sacs and fetuses: one gestational sac containing one embryo  Gestational sac/fetal measurements appropriate for gestation:   CRL= 44 9 11w2d   FHR= 163bpm    Survey of visible fetal and placental anatomic structure within normal limits  Qualitative assessment of amniotic fluid volume/gestational sac shape: wtihin normal limits  Exam of maternal uterus and adnexa: left corpus luteal cyst 1 7cm, otherwise wtihin normal limits  Impression: Single, viable IUP c/w dates  11w2d by LMP and ultrasound        Signatures Electronically signed by : Eliza Tao, ; Oct  3 2017 11:59AM EST                       (Author)    Electronically signed by : TIFFANY Jarquin ; Oct  3 2017 12:32PM EST

## 2018-01-11 NOTE — MISCELLANEOUS
Message  Return to work or school:   Veronica Conde is under my professional care  She was seen in my office on 10/03/2017 Patient is out of work due to a medical condition related to pregnancy  She will follow up with us to determine when to return to work   She is able to return to work on  [employer]  Signatures   Electronically signed by :  Leopoldo Archer, ; Oct  3 2017  1:38PM EST                       (Author)

## 2018-01-12 ENCOUNTER — OB ABSTRACT (OUTPATIENT)
Dept: OBGYN CLINIC | Facility: CLINIC | Age: 28
End: 2018-01-12

## 2018-01-12 VITALS — DIASTOLIC BLOOD PRESSURE: 58 MMHG | WEIGHT: 164 LBS | SYSTOLIC BLOOD PRESSURE: 116 MMHG | BODY MASS INDEX: 24.94 KG/M2

## 2018-01-13 VITALS
DIASTOLIC BLOOD PRESSURE: 56 MMHG | BODY MASS INDEX: 21.73 KG/M2 | HEIGHT: 68 IN | SYSTOLIC BLOOD PRESSURE: 98 MMHG | WEIGHT: 143.38 LBS

## 2018-01-13 NOTE — MISCELLANEOUS
Message   Recorded as Task   Date: 09/27/2017 02:58 PM, Created By: Dagoberto Lan   Task Name: Miscellaneous   Assigned To: Tracie Dukes   Regarding Patient: Ken Hazel, Status: In Progress   Hankmike Germain - 27 Sep 2017 2:58 PM     TASK CREATED  Any updates on this patient? Is her vomiting better on Zofran 8 mg? Tracie Dukes - 27 Sep 2017 4:51 PM     TASK REPLIED TO: Previously Assigned To ZAID HENRIQUEZ ELIZABETH [facility],Team  Patient states she is still nauseous but not vomiting often    She is able to keep a little more food and fluid down  She has a headache and thinks   Cosmo Suarez - 27 Sep 2017 4:59 PM     TASK REPLIED TO: Previously Assigned To [employer], thanks  Let's keep following her  Thanks   Hakan Fontana - 27 Sep 2017 5:06 PM     TASK REASSIGNED: Previously Assigned To [address] - 28 Sep 2017 12:03 PM     TASK IN PROGRESS        Active Problems    1  Cervical cancer screening (V76 2) (Z12 4)   2  Hyperemesis of pregnancy (643 00) (O21 0)   3  Need for influenza vaccination (V04 81) (Z23)   4  Pregnancy, obstetrical care (V22 1) (Z34 90)   5  Screening for HPV (human papillomavirus) (V73 81) (Z11 51)   6  Screening for STDs (sexually transmitted diseases) (V74 5) (Z11 3)   7  Vaginal candidiasis (112 1) (B37 3)    Current Meds   1  Ondansetron 8 MG Oral Tablet Disintegrating; 1 tab PO q 8 hrs prn; Therapy: 90ENM4822 to (Last Rx:03Oct2017)  Requested for: 89MEI7639 Ordered   2  Ondansetron HCl - 8 MG Oral Tablet; TAKE 1 TABLET Every 8 hours PRN for nausea and   vomiting; Therapy: 24JAJ7170 to (Evaluate:13Oct2017) Recorded   3  Phenergan 12 5 MG Rectal Suppository; INSERT 1 SUPPOSITORY EVERY 6 HOURS   AS NEEDED FOR NAUSEA AND VOMITING; Therapy: 56XNH9091 to (77 873 135)  Requested for: 08BQP5278; Last   Rx:10Oct2017 Ordered   4  Prenatal TABS; Therapy: (Recorded:60Urm0051) to Recorded    Allergies    1   No Known Drug Allergies    Signatures   Electronically signed by : James Kern, ; Oct 17 2017  8:42AM EST                       (Author)

## 2018-01-13 NOTE — MISCELLANEOUS
Message   Date: 29 Sep 2017 12:41 PM EST, Recorded By: Sarkis Navarrete For: Pari Sampson   Reason: Other   Faxed rx for breast pump to [employer]  Active Problems    1  Hyperemesis of pregnancy (643 00) (O21 0)    Current Meds   1  Diclegis 10-10 MG Oral Tablet Delayed Release; Therapy: (Recorded:20Ccx4338) to Recorded   2  Ondansetron 4 MG Oral Tablet Disintegrating; TAKE 1 TABLET EVERY 6 TO 8 HOURS   AS NEEDED; Therapy: 83UKZ1789 to 40-45-11-94)  Requested for: 44TQP7638; Last   Rx:86Jhw7127 Ordered   3  Ondansetron 8 MG Oral Tablet Disintegrating; 1 tab PO q 8 hrs prn; Therapy: 85OYV1396 to (Last Aleksander Lever)  Requested for: 29EKS7685 Ordered   4  Prenatal TABS; Therapy: (Recorded:58Sfs2186) to Recorded    Allergies    1   No Known Drug Allergies    Signatures   Electronically signed by : Tony Harris, ; Sep 29 2017 12:42PM EST                       (Author)

## 2018-01-14 VITALS
WEIGHT: 142 LBS | DIASTOLIC BLOOD PRESSURE: 64 MMHG | SYSTOLIC BLOOD PRESSURE: 118 MMHG | HEIGHT: 68 IN | BODY MASS INDEX: 21.52 KG/M2

## 2018-01-14 NOTE — MISCELLANEOUS
Message   Date: 20 Sep 2017 10:10 AM EST, Recorded By: Candy Fernandez For: Marge JefferySashaclifford , Self   Phone: [phone] (Home)   Reason: Medical Complaint   pt is 9 weeks pregnant and is having hyperemesis  [facility] [place] she went to the ER and they gave her diclegijumana Maldonado pt feels that the dr she sees is not helping her with this    pt will schedule NP apt for evaluation           Signatures   Electronically signed by : Sandy Honeycutt, ; Sep 20 2017 10:12AM EST                       (Author)

## 2018-01-15 NOTE — MISCELLANEOUS
Message   Recorded as Task   Date: 09/29/2017 09:44 AM, Created By: Mitzy Chilel   Task Name: Follow Up   Assigned To: Tracie Dukes   Regarding Patient: Ernesto Aiken, Status: Active   CommentEyvozeke Sears - 29 Sep 2017 9:44 AM     TASK CREATED  OB patient called to report, feeling one bit better, still nauseous but only vomited 1 time yesterday  Will continue taking Zofran as directed  Will schedule US, CC and MD appointments  Will continue to call and report  Cosmo Suarez - 29 Sep 2017 9:57 AM     TASK REPLIED TO: Previously Assigned To [employer], thanks        Active Problems    1  Hyperemesis of pregnancy (643 00) (O21 0)    Current Meds   1  Diclegis 10-10 MG Oral Tablet Delayed Release; Therapy: (Recorded:79Jak9821) to Recorded   2  Ondansetron 4 MG Oral Tablet Disintegrating; TAKE 1 TABLET EVERY 6 TO 8 HOURS   AS NEEDED; Therapy: 86UMZ2869 to 40-45-11-94)  Requested for: 15OQE9818; Last   Rx:84Uqe7215 Ordered   3  Ondansetron 8 MG Oral Tablet Disintegrating; 1 tab PO q 8 hrs prn; Therapy: 05HXK2915 to (Last Antonietta Robles)  Requested for: 08IPD1469 Ordered   4  Prenatal TABS; Therapy: (Recorded:41Gsu4822) to Recorded    Allergies    1   No Known Drug Allergies    Signatures   Electronically signed by : Raiza Reyez, ; Sep 29 2017 10:34AM EST                       (Author)

## 2018-01-16 NOTE — MISCELLANEOUS
Message  Return to work or school:        Josie Beaver is a patient in this office for prenatal care  She had an appointment on 9/25/17 and then was sent to the [address] [place] [address] for a lengthy treatment  Please excuse her absence from work  Selvin Cleaning MD / torsten reed        Signatures   Electronically signed by : Hailey Saenz, ; Sep 26 2017  9:41AM EST                       (Author)

## 2018-01-16 NOTE — MISCELLANEOUS
Message   Recorded as Task   Date: 09/28/2017 10:11 AM, Created By: Kg Dugan   Task Name: Medical Complaint Callback   Assigned To: Tracie Dukes   Regarding Patient: Susi Shah, Status: Active   CommentDale Sandra - 28 Sep 2017 10:11 AM     TASK CREATED  JHOANA Luae Mort patient called to report feeling a bit better  Vomited x 1 yesterday and none so far today, although still feels constant nausea  Taking Zofran 8 mg as directed  Eating and drinking small frequent amounts  Able to maintain weight  Does not want to proceed to Zofran pump with [employer] at this time  Prefers to continue oral Zofran 8 mg and to observe  Patient is not going to work for the rest of this week, is resting at home  Will continue to keep in touch with updates  Cosmo Suarez - 28 Sep 2017 10:39 AM     TASK REPLIED TO: Previously Assigned To [employer], thanks for the update        Active Problems    1  Hyperemesis of pregnancy (643 00) (O21 0)    Current Meds   1  Diclegis 10-10 MG Oral Tablet Delayed Release; Therapy: (Recorded:93Tzj2372) to Recorded   2  Ondansetron 4 MG Oral Tablet Disintegrating; TAKE 1 TABLET EVERY 6 TO 8 HOURS   AS NEEDED; Therapy: 10QFZ2912 to 40-45-11-94)  Requested for: 56EJQ8113; Last   Rx:05Rkf5342 Ordered   3  Ondansetron 8 MG Oral Tablet Disintegrating; 1 tab PO q 8 hrs prn; Therapy: 24NIG1253 to (Last Zaida Cutting)  Requested for: 99BNY7643 Ordered   4  Prenatal TABS; Therapy: (Recorded:13Snt9143) to Recorded    Allergies    1   No Known Drug Allergies    Signatures   Electronically signed by : Maty Jolly, ; Sep 28 2017 12:03PM EST                       (Author)

## 2018-01-17 NOTE — MISCELLANEOUS
Message   Recorded as Task   Date: 09/26/2017 09:45 AM, Created By: Lydia Hernandez   Task Name: Follow Up   Assigned To: Tracie Dukes   Regarding Patient: Kristy Lyn, Status: Active   CommentConrad Sasha - 26 Sep 2017 9:45 AM     TASK CREATED  Patient called c/o still nausea after hydration at [facility]  Taking oral Zofran also but still unable to eat  Stayed home from work again  Questions if needs increased dose or alternate Rx  Cosmo Suarez - 26 Sep 2017 12:24 PM     TASK REPLIED TO: Previously Assigned To Cosmo Suarez  Would try to give it another day  We could consider Zofran 8 mg if not improved tomorrow  Also, might want to start [employer] activation for possible subcutaneous pump   Patient informed  Escript sent for increased dose  Patient will observe  Will call back if no improvement to initiate [employer]  Active Problems    1  Hyperemesis of pregnancy (643 00) (O21 0)    Current Meds   1  Diclegis 10-10 MG Oral Tablet Delayed Release; Therapy: (Recorded:74Oid2268) to Recorded   2  Ondansetron 4 MG Oral Tablet Disintegrating; TAKE 1 TABLET EVERY 6 TO 8 HOURS   AS NEEDED; Therapy: 87PJW8698 to 40-45-11-94)  Requested for: 68GES5321; Last   Rx:21Buh9050 Ordered   3  Prenatal TABS; Therapy: (Recorded:91Pzd4892) to Recorded    Allergies    1   No Known Drug Allergies    Plan  Hyperemesis of pregnancy    · Ondansetron 8 MG Oral Tablet Disintegrating; 1 tab PO q 8 hrs prn    Signatures   Electronically signed by : Rafat Mi, ; Sep 26 2017  1:03PM EST                       (Author)

## 2018-01-17 NOTE — MISCELLANEOUS
Message   Recorded as Task   Date: 10/10/2017 10:10 AM, Created By: Venkatesh Rg   Task Name: Med Renewal Request   Assigned To: Tracie Dukes   Regarding Patient: Sonya Osgood, Status: Active   CommentBonifacio Jimenez - 10 Oct 2017 10:10 AM     TASK CREATED  OB patient called to report went to [facility] ER yesterday for increased hyperemesis  Was given IV fluids and Phenergan suppositories  States feeling a little better today  Was able to drink Ensure  Is still taking Zofran also  Requests Rx for more suppositories since was only given enough for today  Cosmo Suarez - 10 Oct 2017 10:39 AM     TASK REPLIED TO: Previously Assigned To Cosmo Suarez  That is fine with me  Thanks   Escript sent      Active Problems    1  Cervical cancer screening (V76 2) (Z12 4)   2  Hyperemesis of pregnancy (643 00) (O21 0)   3  Need for influenza vaccination (V04 81) (Z23)   4  Pregnancy, obstetrical care (V22 1) (Z34 90)   5  Screening for HPV (human papillomavirus) (V73 81) (Z11 51)   6  Screening for STDs (sexually transmitted diseases) (V74 5) (Z11 3)   7  Vaginal candidiasis (112 1) (B37 3)    Current Meds   1  Ondansetron 8 MG Oral Tablet Disintegrating; 1 tab PO q 8 hrs prn; Therapy: 68PKH6274 to (Last Rx:03Oct2017)  Requested for: 10EFK6305 Ordered   2  Ondansetron HCl - 8 MG Oral Tablet; TAKE 1 TABLET Every 8 hours PRN for nausea and   vomiting; Therapy: 81AFC7342 to (Evaluate:13Oct2017) Recorded   3  Prenatal TABS; Therapy: (Recorded:95Glu6358) to Recorded    Allergies    1   No Known Drug Allergies    Plan  Hyperemesis of pregnancy    · Phenergan 12 5 MG Rectal Suppository; INSERT 1 SUPPOSITORY EVERY 6  HOURS AS NEEDED FOR NAUSEA AND VOMITING    Signatures   Electronically signed by : Zhang Moser, ; Oct 10 2017 12:59PM EST                       (Author)

## 2018-01-17 NOTE — MISCELLANEOUS
Message   Date: 09 Oct 2017 3:15 PM EST, Recorded By: Yvonne Lomas For: Shazia Cottrell, Self   Phone: [phone] (Home)   Reason: Medical Complaint   pts nausea is getting worse    she hardly had any fluids today and she has eaten very little the last 2 days  Berhane Cunningham pt feels really bad    advised pt to go to the ER for fluids and she will need to follow-up with us in 1-2 days           Active Problems    1  Cervical cancer screening (V76 2) (Z12 4)   2  Hyperemesis of pregnancy (643 00) (O21 0)   3  Need for influenza vaccination (V04 81) (Z23)   4  Pregnancy, obstetrical care (V22 1) (Z34 90)   5  Screening for HPV (human papillomavirus) (V73 81) (Z11 51)   6  Screening for STDs (sexually transmitted diseases) (V74 5) (Z11 3)   7  Vaginal candidiasis (112 1) (B37 3)    Current Meds   1  Ondansetron 8 MG Oral Tablet Disintegrating; 1 tab PO q 8 hrs prn; Therapy: 18EIA0059 to (Last Rx:03Oct2017)  Requested for: 13LJM9321 Ordered   2  Ondansetron HCl - 8 MG Oral Tablet; TAKE 1 TABLET Every 8 hours PRN for nausea and   vomiting; Therapy: 57DNN2262 to (Evaluate:13Oct2017) Recorded   3  Prenatal TABS; Therapy: (Recorded:61Afx7492) to Recorded    Allergies    1   No Known Drug Allergies    Signatures   Electronically signed by : Pasquale Traylor, ; Oct  9 2017  3:17PM EST                       (Author)

## 2018-01-19 ENCOUNTER — GENERIC CONVERSION - ENCOUNTER (OUTPATIENT)
Dept: OTHER | Facility: OTHER | Age: 28
End: 2018-01-19

## 2018-01-19 ENCOUNTER — ALLSCRIPTS OFFICE VISIT (OUTPATIENT)
Dept: OTHER | Facility: OTHER | Age: 28
End: 2018-01-19

## 2018-01-19 LAB
HCT VFR BLD AUTO: 35.6 % (ref 34–46.6)
HGB BLD-MCNC: 11.9 G/DL (ref 11.1–15.9)

## 2018-01-20 LAB
GESTATIONAL DIABETES SCREEN (HISTORICAL): 116 MG/DL (ref 65–139)
RPR SCREEN (HISTORICAL): NON REACTIVE

## 2018-01-22 VITALS
HEIGHT: 68 IN | BODY MASS INDEX: 21.75 KG/M2 | SYSTOLIC BLOOD PRESSURE: 104 MMHG | DIASTOLIC BLOOD PRESSURE: 66 MMHG | WEIGHT: 143.5 LBS

## 2018-01-22 VITALS
HEIGHT: 68 IN | HEART RATE: 112 BPM | BODY MASS INDEX: 23.04 KG/M2 | SYSTOLIC BLOOD PRESSURE: 116 MMHG | WEIGHT: 152 LBS | DIASTOLIC BLOOD PRESSURE: 70 MMHG

## 2018-01-22 VITALS
OXYGEN SATURATION: 99 % | HEART RATE: 102 BPM | HEIGHT: 68 IN | WEIGHT: 164 LBS | DIASTOLIC BLOOD PRESSURE: 58 MMHG | BODY MASS INDEX: 24.86 KG/M2 | SYSTOLIC BLOOD PRESSURE: 116 MMHG

## 2018-01-23 VITALS
SYSTOLIC BLOOD PRESSURE: 115 MMHG | WEIGHT: 153 LBS | DIASTOLIC BLOOD PRESSURE: 68 MMHG | HEIGHT: 68 IN | BODY MASS INDEX: 23.19 KG/M2

## 2018-01-23 NOTE — MISCELLANEOUS
Message  Please excuse Lei Orellana from work until 2 7 2018  We will provide additional notice after her next visit  Return to work or school:  01/19/2018 02/07/2018      Dr Lex Groves          Signatures   Electronically signed by : Zackery Christopher, ; Jan 19 2018  9:43AM EST                       (Author)

## 2018-01-23 NOTE — CONSULTS
I had the pleasure of evaluating your patient, Len Waters  My full evaluation follows:      Chief Complaint  Here for ultrasound study      History of Present Illness  Please refer to the ultrasound report for additional information  Active Problems    1  Cervical cancer screening (V76 2) (Z12 4)   2  Hyperemesis of pregnancy (643 00) (O21 0)   3  Need for influenza vaccination (V04 81) (Z23)   4  Pregnancy, obstetrical care (V22 1) (Z34 90)   5  Screening for HPV (human papillomavirus) (V73 81) (Z11 51)   6  Screening for STDs (sexually transmitted diseases) (V74 5) (Z11 3)   7  Vaginal candidiasis (112 1) (B37 3)    Past Medical History    · History of  1   · History of urinary tract infection (V13 02) (Z87 440)    Surgical History    · History of Oral Surgery Tooth Extraction [place] Tooth   · History of Sinus Surgery    Current Meds   1  Ondansetron 8 MG Oral Tablet Disintegrating; TAKE 8 MG Every 8 hours PRN; Therapy: 94MIW6805 to (Last 9198 4543)  Requested for: 2017 Ordered   2  Ondansetron HCl - 8 MG Oral Tablet; TAKE 1 TABLET Every 8 hours PRN; Therapy: 99RTS7186 to (Evaluate:96Xun1591); Last Rx:2017 Ordered   3  Phenergan 12 5 MG Rectal Suppository; INSERT 1 SUPPOSITORY EVERY 6 HOURS AS   NEEDED FOR NAUSEA AND VOMITING; Therapy: 97IMG3984 to (Evaluate:91Fyh1447)  Requested for: 2017; Last   Rx:2017 Ordered   4  Prenatal TABS; Therapy: (Recorded:51Bjh0138) to Recorded    Allergies    1  No Known Drug Allergies    Vitals   Recorded: 17TID2967 70:73BJ   Systolic 633, LUE, Sitting   Diastolic 68, LUE, Sitting   Height 5 ft 8 in   Weight 153 lb    BMI Calculated 23 26   BSA Calculated 1 82   Pain Scale 0     Results/Data  Exam description: level II obstetrical ultrasound, transvaginal obstetrical ultrasound  Findings: Please refer to the ultrasound report for additional information        Discussion/Summary    Please refer to the ultrasound report for additional information  The patient was counseled regarding diagnostic results, instructions for management, prognosis, impressions  Thank you very much for allowing me to participate in the care of this patient  If you have any questions, please do not hesitate to contact me        Future Appointments    Signatures   Electronically signed by : TIFFANY Fischer ; Dec  5 2017  9:39AM EST                       (Author)

## 2018-01-23 NOTE — MISCELLANEOUS
Message   Recorded as Task   Date: 2018 09:38 AM, Created By: Javi Solorzano   Task Name: Miscellaneous   Assigned To: Leonor Kim   Regarding Patient: Bryan Sibley, Status: In Progress   Shayla Boyer - 2018 9:38 AM     TASK CREATED  Please call patient on  to see how she is doing  She continues to have nausea/vomiting and had 6+ lb weight loss at the most recent prenatal visit  Thanks   Tracie Dukes - 2018 10:05 AM     TASK IN PROGRESS   Patient states feeling a little better today  Advised small frequent light meals  Active Problems    1  Cervical cancer screening (V76 2) (Z12 4)   2  Encounter for  screening for cervical length (V28 89) (Z36 86)   3  Encounter for  screening for malformation (V28 89) (Z36 3)   4  Hyperemesis of pregnancy (643 00) (O21 0)   5  Need for influenza vaccination (V04 81) (Z23)   6  Pregnancy, obstetrical care (V22 1) (Z34 90)   7  Screening for HPV (human papillomavirus) (V73 81) (Z11 51)   8  Screening for STDs (sexually transmitted diseases) (V74 5) (Z11 3)   9  Vaginal candidiasis (112 1) (B37 3)    Current Meds   1  Ondansetron 8 MG Oral Tablet Disintegrating; TAKE 8 MG Every 8 hours PRN; Therapy: 49EWB4234 to (Last 9196 2454)  Requested for: 2017 Ordered   2  Ondansetron HCl - 8 MG Oral Tablet; TAKE 1 TABLET Every 8 hours PRN; Therapy: 39GLZ9370 to (Evaluate:53Rzm1855); Last Rx:2017 Ordered   3  Phenergan 12 5 MG Rectal Suppository; INSERT 1 SUPPOSITORY EVERY 6 HOURS   AS NEEDED FOR NAUSEA AND VOMITING; Therapy: 01MNA0247 to (Evaluate:20Nts9087)  Requested for: 13CMY7831; Last   Rx:2018 Ordered   4  Prenatal TABS; Therapy: (Recorded:01Flg5346) to Recorded    Allergies    1   No Known Drug Allergies    Signatures   Electronically signed by : Jayda Melo, ; 2018 10:37AM EST                       (Author)

## 2018-01-23 NOTE — MISCELLANEOUS
Message  Return to work or school:   Saud Davis is under my professional care  She was seen in my office on 01/2/2018 , due to hyperemesis Ellen Claudio will be out of work until her next doctor appointment on 01/19/2018 at which point we will evaluate her illness   Dr Ruthann Esquivel  Signatures   Electronically signed by :  Scot Jaffe, ; Jan 2 2018  4:24PM EST                       (Author)

## 2018-01-23 NOTE — PROGRESS NOTES
DEC 5 2017         RE: Deborah Aguilar                                To: [facility]   MR#: [id number]                                  [address] [place] [address]   :  Levine, Susan. \[facility]ospital [facility].\"", [address] [place] [address] Street: [id number]                             Fax: [phone]   (Exam #: [id number])      The LMP of this 33year old,  G1, P0-0-0-0 patient was 2017, giving   her an ENEDELIA of 2018 and a current gestational age of 25 weeks 2 days   by dates  A sonographic examination was performed on DEC 5 2017 using real   time equipment  The ultrasound examination was performed using abdominal   technique  The patient has a BMI of 23 3  Her blood pressure today was   115/68  Earliest US on record:   10/03/17  11w2d  2018  ENEDELIA      Thank you very much for your kind referral of Deborah Aguilar to the   [facility]  in [place] on 2017 for level II ultrasound   evaluation and MFM assessment  Nishi Watson is a 61-year-old primigravida who is   currently at 20-2/7 weeks gestation by an estimated due date of 2018 which is based upon menstrual dating  Her early prenatal course has   been remarkable for hyperemesis gravidarum, for which she has required   multiple visits to the Emergency Department for evaluation and treatment,   including IV hydration  She remains treated with Zofran and Phenergan for   this indication  She lost 15 pounds early in the pregnancy, which she has   recently regained  Otherwise, Nishi Watson has had an unremarkable prenatal   course so far  She has no complaints today  She reports fetal movement and   denies vaginal bleeding  Nishi Watson has not yet received the influenza vaccine   during this pregnancy and declined genetic screening recently  Nishi Watson has a past medical history significant for asthma as a child,   otherwise unremarkable   Her past surgical history is significant for sinus   surgery and wisdom teeth removal  She currently takes no other medication   with the exception of a prenatal vitamin on a daily basis and has no known   drug allergy  She denies tobacco, alcohol, or illicit drug use during the   pregnancy  The family medical history is negative with respect to first   degree relatives with diabetes, hypertension, or venous thromboembolism  The family genetic history is negative with respect to genetic   abnormalities, birth defects, or mental retardation  Cardiac motion was observed at 155 bpm       INDICATIONS      fetal anatomical survey      Exam Types      LEVEL II   Transvaginal      RESULTS      Fetus # 1 of 1   Variable presentation   Fetal growth appeared normal   Placenta Location = Anterior   No placenta previa   Placenta Grade = I      MEASUREMENTS (* Included In Average GA)      AC              14 8 cm        19 weeks 5 days* (40%)   BPD              4 6 cm        19 weeks 6 days* (42%)   HC              17 9 cm        20 weeks 2 days* (47%)   Femur            3 1 cm        19 weeks 5 days* (30%)      Nuchal Fold      3 3 mm   NBL              5 7 mm      Humerus          3 3 cm        21 weeks 2 days  (72%)      Cerebellum       2 2 cm        21 weeks 4 days   CisternaMagna    4 1 mm      HC/AC           1 21   FL/AC           0 21   FL/BPD          0 67   EFW (Ac/Fl/Hc)   319 grams - 0 lbs 11 oz      THE AVERAGE GESTATIONAL AGE is 19 weeks 6 days +/- 10 days  AMNIOTIC FLUID         Largest Vertical Pocket = 5 6 cm   Amniotic Fluid: Normal      CERVICAL EVALUATION      The cervix appeared normal (Ultrasound Examination)  SUPINE      Cervical Length: 4 10 cm      OTHER TEST RESULTS           Funneling?: No             Dynamic Changes?: No        Resp  To TFP?: No                      Debris?: No      ANATOMY      Head                                    Normal   Face/Neck                               Normal   Th  Cav                                  Normal   Heart Normal   Abd  Cav  Normal   Stomach                                 Normal   Right Kidney                            Normal   Left Kidney                             Normal   Bladder                                 Normal   Abd  Wall                               Normal   Spine                                   Normal   Extrems                                 Normal   Genitalia                               Normal   Placenta                                Normal   Umbl  Cord                              Normal   Uterus                                  Normal   PCI                                     Normal      ANATOMY DETAILS      Visualized Appearing Sonographically Normal:   HEAD: (Calvarium, BPD Level, Cavum, Lateral Ventricles, Choroid Plexus,   Cerebellum, Cisterna Magna);    FACE/NECK: (Neck, Nuchal Fold, Profile,   Orbits, Nose/Lips, Palate, Face);    TH  CAV  : (Lungs, Diaphragm); HEART: (Four Chamber View, Proximal Left Outflow, Proximal Right Outflow,   3VV, 3 Vessel Trachea, Short Axis of Greater Vessels, Ductal Arch, Aortic   Arch, Interventricular Septum, Interatrial Septum, IVC, SVC, Cardiac Axis,   Cardiac Position);    ABD  CAV : (Liver);    STOMACH, RIGHT KIDNEY, LEFT   KIDNEY, BLADDER, ABD  WALL, SPINE: (Cervical Spine, Thoracic Spine, Lumbar   Spine, Sacrum);    EXTREMS: (Lt Humerus, Rt Humerus, Lt Forearm, Rt   Forearm, Lt Hand, Rt Hand, Lt Femur, Rt Femur, Lt Low Leg, Rt Low Leg, Lt   Foot, Rt Foot);    GENITALIA (Female), PLACENTA, UMBL  CORD, UTERUS, PCI      ADNEXA      The left ovary appeared normal and measured 2 9 x 2 5 x 2 0 cm with a   volume of 7 6 cc  The right ovary appeared normal and measured 2 7 x 2 6 x   1 8 cm with a volume of 6 6 cc        IMPRESSION      [facility]ou IUP   19 weeks and 6 days by this ultrasound  (ENEDELIA=APR 25 2018)   Variable presentation   Fetal growth appeared normal   Normal anatomy survey   Regular fetal heart rate of 155 bpm   Anterior placenta   No placenta previa      GENERAL COMMENT      No fetal structural abnormality or ultrasound marker for aneuploidy is   identified on the Level II ultrasound study today  Fetal growth and   amniotic fluid volume are normal   The placenta is normal in appearance  The cervix is normal in appearance by transvaginal sonography  The   cervical length is normal   Cervical debris is not present  Cervical   funneling is not present  Neither provocative nor dynamic change is   appreciated  Today's ultrasound findings and suggested follow-up were discussed in   detail with Fiona  We discussed that prenatal ultrasound cannot rule out   all congenital abnormalities  Fiona and I discussed the importance of   receiving the influenza vaccine during pregnancy  No further appointment   has been scheduled in the [address] [place] [address] at this time  Follow-up [facility] ultrasound evaluation is recommended as clinically indicated  The face to face time, in addition to time spent discussing ultrasound   results, was approximately 10 minutes, greater than 50% of which was spent   during counseling and coordination of care  JUJU Cano M D     Maternal-Fetal Medicine   Electronically signed 12/05/17 09:45

## 2018-01-24 VITALS
DIASTOLIC BLOOD PRESSURE: 65 MMHG | SYSTOLIC BLOOD PRESSURE: 117 MMHG | HEART RATE: 97 BPM | WEIGHT: 153 LBS | BODY MASS INDEX: 23.19 KG/M2 | HEIGHT: 68 IN

## 2018-01-24 VITALS
WEIGHT: 155 LBS | DIASTOLIC BLOOD PRESSURE: 70 MMHG | SYSTOLIC BLOOD PRESSURE: 117 MMHG | BODY MASS INDEX: 23.49 KG/M2 | HEIGHT: 68 IN | HEART RATE: 127 BPM

## 2018-02-07 ENCOUNTER — TELEPHONE (OUTPATIENT)
Dept: OBGYN CLINIC | Facility: CLINIC | Age: 28
End: 2018-02-07

## 2018-02-07 DIAGNOSIS — Z34.90 PREGNANCY, UNSPECIFIED GESTATIONAL AGE: Primary | ICD-10-CM

## 2018-02-08 ENCOUNTER — ROUTINE PRENATAL (OUTPATIENT)
Dept: OBGYN CLINIC | Facility: CLINIC | Age: 28
End: 2018-02-08
Payer: COMMERCIAL

## 2018-02-08 VITALS — DIASTOLIC BLOOD PRESSURE: 62 MMHG | WEIGHT: 159 LBS | SYSTOLIC BLOOD PRESSURE: 120 MMHG | BODY MASS INDEX: 24.18 KG/M2

## 2018-02-08 DIAGNOSIS — Z23 NEED FOR DIPHTHERIA-TETANUS-PERTUSSIS (TDAP) VACCINE: Primary | ICD-10-CM

## 2018-02-08 DIAGNOSIS — O21.0 HYPEREMESIS AFFECTING PREGNANCY, ANTEPARTUM: ICD-10-CM

## 2018-02-08 DIAGNOSIS — Z34.91 PREGNANCY, OBSTETRICAL CARE, FIRST TRIMESTER: ICD-10-CM

## 2018-02-08 PROCEDURE — 90471 IMMUNIZATION ADMIN: CPT

## 2018-02-08 PROCEDURE — 90715 TDAP VACCINE 7 YRS/> IM: CPT

## 2018-02-08 PROCEDURE — PNV: Performed by: OBSTETRICS & GYNECOLOGY

## 2018-02-08 RX ORDER — ONDANSETRON 8 MG/1
8 TABLET, ORALLY DISINTEGRATING ORAL EVERY 12 HOURS PRN
Qty: 12 TABLET | Refills: 3 | Status: ON HOLD | OUTPATIENT
Start: 2018-02-08 | End: 2018-04-15 | Stop reason: ALTCHOICE

## 2018-02-08 NOTE — LETTER
February 8, 2018     Patient: Sanjiv Rodriguez   YOB: 1990   Date of Visit: 2/8/2018       To Whom it May Concern:    Sanjiv Rodriguez is under my professional care  She was seen in my office on 2/8/2018  Please excuse Clementejim Dozier from work until her upcoming visit on 2/28/18 due to continued nausea/vomiting of pregnancy  If you have any questions or concerns, please don't hesitate to call           Sincerely,          Dhaval Miranda MD        CC: No Recipients

## 2018-02-08 NOTE — PROGRESS NOTES
Patient seen today for follow-up visit  She continues to have intermittent hyperemesis with nausea and vomiting  She had vomiting twice yesterday  Good fetal movement noted  Fetal movement and  labor precautions were reviewed  She has appointment for follow-up growth ultrasound 18 at [address] [place] [address]  Printed Prescription for Zofran ODT 8 mg tablets 12  To take twice daily p r n  refill 3 was sent to local pharmacy  She will follow up in 2-3 weeks time for prenatal visit  Tdap was given today    Note was given to stay out of work secondary to hyperemesis with nausea/vomiting/dizziness until the follow-up visit 18

## 2018-02-09 ENCOUNTER — HOSPITAL ENCOUNTER (OUTPATIENT)
Dept: ULTRASOUND IMAGING | Facility: HOSPITAL | Age: 28
Discharge: HOME | End: 2018-02-09
Attending: OBSTETRICS & GYNECOLOGY
Payer: COMMERCIAL

## 2018-02-09 DIAGNOSIS — Z34.90 PREGNANCY, UNSPECIFIED GESTATIONAL AGE: ICD-10-CM

## 2018-02-09 PROCEDURE — 76816 OB US FOLLOW-UP PER FETUS: CPT

## 2018-02-28 ENCOUNTER — ROUTINE PRENATAL (OUTPATIENT)
Dept: OBGYN CLINIC | Facility: CLINIC | Age: 28
End: 2018-02-28

## 2018-02-28 VITALS
HEART RATE: 102 BPM | WEIGHT: 161.6 LBS | SYSTOLIC BLOOD PRESSURE: 142 MMHG | DIASTOLIC BLOOD PRESSURE: 80 MMHG | BODY MASS INDEX: 24.57 KG/M2

## 2018-02-28 DIAGNOSIS — Z3A.32 32 WEEKS GESTATION OF PREGNANCY: ICD-10-CM

## 2018-02-28 DIAGNOSIS — Z34.93 PRENATAL CARE IN THIRD TRIMESTER: Primary | ICD-10-CM

## 2018-02-28 PROBLEM — B37.3 VAGINAL CANDIDIASIS: Status: ACTIVE | Noted: 2017-10-03

## 2018-02-28 PROBLEM — Z92.29 HISTORY OF INFLUENZA VACCINATION: Status: ACTIVE | Noted: 2018-02-28

## 2018-02-28 PROBLEM — O21.0 HYPEREMESIS OF PREGNANCY: Status: ACTIVE | Noted: 2017-09-25

## 2018-02-28 PROBLEM — B37.3 VAGINAL CANDIDIASIS: Status: RESOLVED | Noted: 2017-10-03 | Resolved: 2018-02-28

## 2018-02-28 PROBLEM — Z92.29 HISTORY OF TETANUS, DIPHTHERIA, AND ACELLULAR PERTUSSIS BOOSTER VACCINATION (TDAP): Status: ACTIVE | Noted: 2018-02-28

## 2018-02-28 PROCEDURE — PNV: Performed by: OBSTETRICS & GYNECOLOGY

## 2018-02-28 NOTE — PROGRESS NOTES
Patient is doing well  Her hyperemesis is improved  She does note some pelvic pressure and abdominal discomfort likely related to enlarging uterus and muscle/ligament stretching  She denies any roseline contractions  She was counseled about  labor and will call with any such symptoms not resolved with rest and adequate fluid hydration within 1 hour  Fetal movement and  labor precautions were reviewed  She will follow up in 2 weeks time prenatal   Addendum-blood pressure 142/80, repeat 138/82  We will check it 1 more time  She denies any symptoms of preeclampsia and will call with any such symptoms  Her exam was notable for nontender abdomen with no edema and no clonus  Neyebkhf-[id number]-we blood pressure much improved  We will recheck at 2 week visit

## 2018-03-16 ENCOUNTER — ROUTINE PRENATAL (OUTPATIENT)
Dept: OBGYN CLINIC | Facility: CLINIC | Age: 28
End: 2018-03-16

## 2018-03-16 VITALS
BODY MASS INDEX: 24.78 KG/M2 | WEIGHT: 163 LBS | DIASTOLIC BLOOD PRESSURE: 60 MMHG | HEART RATE: 109 BPM | SYSTOLIC BLOOD PRESSURE: 116 MMHG

## 2018-03-16 DIAGNOSIS — Z92.29 HISTORY OF TETANUS, DIPHTHERIA, AND ACELLULAR PERTUSSIS BOOSTER VACCINATION (TDAP): ICD-10-CM

## 2018-03-16 DIAGNOSIS — Z92.29 HISTORY OF INFLUENZA VACCINATION: ICD-10-CM

## 2018-03-16 DIAGNOSIS — Z3A.34 34 WEEKS GESTATION OF PREGNANCY: Primary | ICD-10-CM

## 2018-03-16 PROCEDURE — PNV: Performed by: OBSTETRICS & GYNECOLOGY

## 2018-03-16 NOTE — PROGRESS NOTES
Patient is doing well  She continues to note intermittent nausea and vomiting but not severe  She does note some muscle pains under the ribcage  Exam notable for some tenderness at the right anterior CVA, consistent with muscular issues  No apparent liver or gallbladder discomfort was appreciated  She uses ice packs with good results and she will continue to do that  All questions were answered  She will follow up in 2 weeks time for prenatal and GBS

## 2018-03-26 ENCOUNTER — TELEPHONE (OUTPATIENT)
Dept: OBGYN CLINIC | Facility: CLINIC | Age: 28
End: 2018-03-26

## 2018-03-27 NOTE — TELEPHONE ENCOUNTER
Called patient and explained to her about our central fax and that it hasn't been routed here yet  She will bring them in tomorrow at her visit and I told her we could have them completed and faxed back to wherever they needed to go by Friday  She is going to ask for me

## 2018-03-27 NOTE — TELEPHONE ENCOUNTER
I did not see them  I haven't done any in like 2 weeks or more  Her name is also not on the list out front as being given to Lisa davis or Dee  Do you know how she got them to us? If it was faxed we may see them by fall

## 2018-03-28 ENCOUNTER — ROUTINE PRENATAL (OUTPATIENT)
Dept: OBGYN CLINIC | Facility: CLINIC | Age: 28
End: 2018-03-28

## 2018-03-28 VITALS
WEIGHT: 166.8 LBS | BODY MASS INDEX: 24.71 KG/M2 | HEART RATE: 118 BPM | HEIGHT: 69 IN | SYSTOLIC BLOOD PRESSURE: 102 MMHG | DIASTOLIC BLOOD PRESSURE: 58 MMHG

## 2018-03-28 DIAGNOSIS — O21.0 HYPEREMESIS OF PREGNANCY: ICD-10-CM

## 2018-03-28 DIAGNOSIS — Z3A.36 36 WEEKS GESTATION OF PREGNANCY: Primary | ICD-10-CM

## 2018-03-28 PROCEDURE — PNV: Performed by: OBSTETRICS & GYNECOLOGY

## 2018-03-28 NOTE — PATIENT INSTRUCTIONS
Pregnancy at 28 to [address]:   What changes are happening in my body? You are considered full term at the beginning of 37 weeks  Your breathing may be easier if your baby has moved down into a head-down position  You may need to urinate more often because the baby may be pressing on your bladder  You may also feel more discomfort and get tired easily  How do I care for myself at this stage of my pregnancy? · Eat a variety of healthy foods  Healthy foods include fruits, vegetables, whole-grain breads, low-fat dairy foods, beans, lean meats, and fish  Drink liquids as directed  Ask how much liquid to drink each day and which liquids are best for you  Limit caffeine to less than 200 milligrams each day  Limit your intake of fish to 2 servings each week  Choose fish low in mercury such as canned light tuna, shrimp, salmon, cod, or tilapia  Do not  eat fish high in mercury such as swordfish, tilefish, madison mackerel, and shark  · Take prenatal vitamins as directed  Your need for certain vitamins and minerals, such as folic acid, increases during pregnancy  Prenatal vitamins provide some of the extra vitamins and minerals you need  Prenatal vitamins may also help to decrease the risk of certain birth defects  · Rest as needed  Put your feet up if you have swelling in your ankles and feet  · Do not smoke  If you smoke, it is never too late to quit  Smoking increases your risk of a miscarriage and other health problems during your pregnancy  Smoking can cause your baby to be born too early or weigh less at birth  Ask your healthcare provider for information if you need help quitting  · Do not drink alcohol  Alcohol passes from your body to your baby through the placenta  It can affect your baby's brain development and cause fetal alcohol syndrome (FAS)  FAS is a group of conditions that causes mental, behavior, and growth problems       · Talk to your healthcare provider before you take any medicines  Many medicines may harm your baby if you take them when you are pregnant  Do not take any medicines, vitamins, herbs, or supplements without first talking to your healthcare provider  Never use illegal or street drugs (such as marijuana or cocaine) while you are pregnant  · Talk to your healthcare provider before you travel  You may not be able to travel in an airplane after 36 weeks  He may also recommend that you avoid long road trips  What are some safety tips during pregnancy? · Avoid hot tubs and saunas  Do not use a hot tub or sauna while you are pregnant, especially during your first trimester  Hot tubs and saunas may raise your baby's temperature and increase the risk of birth defects  · Avoid toxoplasmosis  This is an infection caused by eating raw meat or being around infected cat feces  It can cause birth defects, miscarriages, and other problems  Wash your hands after you touch raw meat  Make sure any meat is well-cooked before you eat it  Avoid raw eggs and unpasteurized milk  Use gloves or ask someone else to clean your cat's litter box while you are pregnant  · Ask your healthcare provider about travel  The most comfortable time to travel is during the second trimester  Ask your healthcare provider if you can travel after 36 weeks  You may not be able to travel in an airplane after 36 weeks  He may also recommend that you avoid long road trips  What changes are happening with my baby? By 38 weeks, your baby may weigh between 6 and 9 pounds  Your baby may be about 14 inches long from the top of the head to the rump (baby's bottom)  Your baby hears well enough to know your voice  As your baby gets larger, you may feel fewer kicks and more stretching and rolling  Your baby may move into a head-down position  Your baby will also rest lower in your abdomen  What do I need to know about prenatal care?   Your healthcare provider will check your blood pressure and weight  You may also need the following:  · A urine test  may also be done to check for sugar and protein  These can be signs of gestational diabetes or infection  Protein in your urine may also be a sign of preeclampsia  Preeclampsia is a condition that can develop during week 20 or later of your pregnancy  It causes high blood pressure, and it can cause problems with your kidneys and other organs  · A blood test  may be done to check for anemia (low iron level)  · A Tdap vaccine  may be recommended by your healthcare provider  · A group B strep test  is a test that is done to check for group B strep infection  Group B strep is a type of bacteria that may be found in the vagina or rectum  It can be passed to your baby during delivery if you have it  Your healthcare provider will take swab your vagina or rectum and send the sample to the lab for tests  · Fundal height  is a measurement of your uterus to check your baby's growth  This number is usually the same as the number of weeks that you have been pregnant  Your healthcare provider may also check your baby's position  · Your baby's heart rate  will be checked  When should I seek immediate care? · You develop a severe headache that does not go away  · You have new or increased vision changes, such as blurred or spotted vision  · You have new or increased swelling in your face or hands  · You have vaginal spotting or bleeding  · Your water broke or you feel warm water gushing or trickling from your vagina  When should I contact my healthcare provider? · You have more than 5 contractions in 1 hour  · You notice any changes in your baby's movements  · You have abdominal cramps, pressure, or tightening  · You have a change in vaginal discharge  · You have chills or a fever  · You have vaginal itching, burning, or pain  · You have yellow, green, white, or foul-smelling vaginal discharge      · You have pain or burning when you urinate, less urine than usual, or pink or bloody urine  · You have questions or concerns about your condition or care  CARE AGREEMENT:   You have the right to help plan your care  Learn about your health condition and how it may be treated  Discuss treatment options with your caregivers to decide what care you want to receive  You always have the right to refuse treatment  The above information is an  only  It is not intended as medical advice for individual conditions or treatments  Talk to your doctor, nurse or pharmacist before following any medical regimen to see if it is safe and effective for you  © 2017 [employer] Information is for End User's use only and may not be sold, redistributed or otherwise used for commercial purposes  All illustrations and images included in CareNotes® are the copyrighted property of [employer] M [employer]  or [employer]

## 2018-03-28 NOTE — PROGRESS NOTES
Assessment/Plan:    Hyperemesis of pregnancy  Pt is feeling much better but she still reports that she is vomiting 1 x day but always nauseous and tired unable to do much activity or go to work   [employer] paper filled out again     36 weeks gestation of pregnancy  1  GBS done today    Went over the testing and infection and reason for testing and treatment when / if positive     2  Labor precautions  contractions every 3-5 min apart for 1-2 hours   water breaks vaginal bleeding call or come to the office asap     3  Breast feeding      Pt wants to exclusively breast feed rahul over signs and ways to know when to supplement    4  Birth plan discussed on when to come to hospital and when to call office if concerns    5  Resource of baby and me given to patient              Subjective:      Patient ID: Mary Hawley is a 33 y o  female      HPI    The following portions of the patient's history were reviewed and updated as appropriate: allergies, current medications, past family history, past medical history, past social history, past surgical history and problem list     Review of Systems       no pain no contraction   No vaginal bleeding   No leakage of fluid   Objective:      /58 (BP Location: Left arm, Cuff Size: Standard)   Pulse (!) 118   Ht 5' 9" (1 753 m)   Wt 75 7 kg (166 lb 12 8 oz)   LMP 07/16/2017 (Exact Date)   BMI 24 63 kg/m²          Physical Exam      + fetal heart tones  fundal height appropriate

## 2018-03-28 NOTE — ASSESSMENT & PLAN NOTE
Pt is feeling much better but she still reports that she is vomiting 1 x day but always nauseous and tired unable to do much activity or go to work   [employer] paper filled out again

## 2018-03-28 NOTE — ASSESSMENT & PLAN NOTE
1  GBS done today    Went over the testing and infection and reason for testing and treatment when / if positive     2  Labor precautions  contractions every 3-5 min apart for 1-2 hours   water breaks vaginal bleeding call or come to the office asap     3  Breast feeding      Pt wants to exclusively breast feed rahul over signs and ways to know when to supplement    4  Birth plan discussed on when to come to hospital and when to call office if concerns    5    Resource of baby and me given to patient

## 2018-03-31 LAB — GP B STREP DNA SPEC QL NAA+PROBE: NEGATIVE

## 2018-04-09 ENCOUNTER — ROUTINE PRENATAL (OUTPATIENT)
Dept: OBGYN CLINIC | Facility: CLINIC | Age: 28
End: 2018-04-09

## 2018-04-09 VITALS
WEIGHT: 169.2 LBS | BODY MASS INDEX: 25.06 KG/M2 | DIASTOLIC BLOOD PRESSURE: 62 MMHG | SYSTOLIC BLOOD PRESSURE: 110 MMHG | HEIGHT: 69 IN | HEART RATE: 112 BPM

## 2018-04-09 DIAGNOSIS — Z3A.38 38 WEEKS GESTATION OF PREGNANCY: ICD-10-CM

## 2018-04-09 DIAGNOSIS — B37.9 YEAST INFECTION: Primary | ICD-10-CM

## 2018-04-09 PROBLEM — O21.0 HYPEREMESIS OF PREGNANCY: Status: RESOLVED | Noted: 2017-09-25 | Resolved: 2018-04-09

## 2018-04-09 PROCEDURE — PNV: Performed by: OBSTETRICS & GYNECOLOGY

## 2018-04-09 RX ORDER — CLOTRIMAZOLE AND BETAMETHASONE DIPROPIONATE 10; .64 MG/G; MG/G
CREAM TOPICAL 2 TIMES DAILY
Qty: 30 G | Refills: 0 | Status: SHIPPED | OUTPATIENT
Start: 2018-04-09

## 2018-04-09 NOTE — PATIENT INSTRUCTIONS
Pregnancy at 28 to [address]:   What changes are happening in my body? You are considered full term at the beginning of 37 weeks  Your breathing may be easier if your baby has moved down into a head-down position  You may need to urinate more often because the baby may be pressing on your bladder  You may also feel more discomfort and get tired easily  How do I care for myself at this stage of my pregnancy? · Eat a variety of healthy foods  Healthy foods include fruits, vegetables, whole-grain breads, low-fat dairy foods, beans, lean meats, and fish  Drink liquids as directed  Ask how much liquid to drink each day and which liquids are best for you  Limit caffeine to less than 200 milligrams each day  Limit your intake of fish to 2 servings each week  Choose fish low in mercury such as canned light tuna, shrimp, salmon, cod, or tilapia  Do not  eat fish high in mercury such as swordfish, tilefish, madison mackerel, and shark  · Take prenatal vitamins as directed  Your need for certain vitamins and minerals, such as folic acid, increases during pregnancy  Prenatal vitamins provide some of the extra vitamins and minerals you need  Prenatal vitamins may also help to decrease the risk of certain birth defects  · Rest as needed  Put your feet up if you have swelling in your ankles and feet  · Do not smoke  If you smoke, it is never too late to quit  Smoking increases your risk of a miscarriage and other health problems during your pregnancy  Smoking can cause your baby to be born too early or weigh less at birth  Ask your healthcare provider for information if you need help quitting  · Do not drink alcohol  Alcohol passes from your body to your baby through the placenta  It can affect your baby's brain development and cause fetal alcohol syndrome (FAS)  FAS is a group of conditions that causes mental, behavior, and growth problems       · Talk to your healthcare provider before you take any medicines  Many medicines may harm your baby if you take them when you are pregnant  Do not take any medicines, vitamins, herbs, or supplements without first talking to your healthcare provider  Never use illegal or street drugs (such as marijuana or cocaine) while you are pregnant  · Talk to your healthcare provider before you travel  You may not be able to travel in an airplane after 36 weeks  He may also recommend that you avoid long road trips  What are some safety tips during pregnancy? · Avoid hot tubs and saunas  Do not use a hot tub or sauna while you are pregnant, especially during your first trimester  Hot tubs and saunas may raise your baby's temperature and increase the risk of birth defects  · Avoid toxoplasmosis  This is an infection caused by eating raw meat or being around infected cat feces  It can cause birth defects, miscarriages, and other problems  Wash your hands after you touch raw meat  Make sure any meat is well-cooked before you eat it  Avoid raw eggs and unpasteurized milk  Use gloves or ask someone else to clean your cat's litter box while you are pregnant  · Ask your healthcare provider about travel  The most comfortable time to travel is during the second trimester  Ask your healthcare provider if you can travel after 36 weeks  You may not be able to travel in an airplane after 36 weeks  He may also recommend that you avoid long road trips  What changes are happening with my baby? By 38 weeks, your baby may weigh between 6 and 9 pounds  Your baby may be about 14 inches long from the top of the head to the rump (baby's bottom)  Your baby hears well enough to know your voice  As your baby gets larger, you may feel fewer kicks and more stretching and rolling  Your baby may move into a head-down position  Your baby will also rest lower in your abdomen  What do I need to know about prenatal care?   Your healthcare provider will check your blood pressure and weight  You may also need the following:  · A urine test  may also be done to check for sugar and protein  These can be signs of gestational diabetes or infection  Protein in your urine may also be a sign of preeclampsia  Preeclampsia is a condition that can develop during week 20 or later of your pregnancy  It causes high blood pressure, and it can cause problems with your kidneys and other organs  · A blood test  may be done to check for anemia (low iron level)  · A Tdap vaccine  may be recommended by your healthcare provider  · A group B strep test  is a test that is done to check for group B strep infection  Group B strep is a type of bacteria that may be found in the vagina or rectum  It can be passed to your baby during delivery if you have it  Your healthcare provider will take swab your vagina or rectum and send the sample to the lab for tests  · Fundal height  is a measurement of your uterus to check your baby's growth  This number is usually the same as the number of weeks that you have been pregnant  Your healthcare provider may also check your baby's position  · Your baby's heart rate  will be checked  When should I seek immediate care? · You develop a severe headache that does not go away  · You have new or increased vision changes, such as blurred or spotted vision  · You have new or increased swelling in your face or hands  · You have vaginal spotting or bleeding  · Your water broke or you feel warm water gushing or trickling from your vagina  When should I contact my healthcare provider? · You have more than 5 contractions in 1 hour  · You notice any changes in your baby's movements  · You have abdominal cramps, pressure, or tightening  · You have a change in vaginal discharge  · You have chills or a fever  · You have vaginal itching, burning, or pain  · You have yellow, green, white, or foul-smelling vaginal discharge      · You have pain or burning when you urinate, less urine than usual, or pink or bloody urine  · You have questions or concerns about your condition or care  CARE AGREEMENT:   You have the right to help plan your care  Learn about your health condition and how it may be treated  Discuss treatment options with your caregivers to decide what care you want to receive  You always have the right to refuse treatment  The above information is an  only  It is not intended as medical advice for individual conditions or treatments  Talk to your doctor, nurse or pharmacist before following any medical regimen to see if it is safe and effective for you  © 2017 [employer] Information is for End User's use only and may not be sold, redistributed or otherwise used for commercial purposes  All illustrations and images included in CareNotes® are the copyrighted property of [employer] M [employer]  or [employer]

## 2018-04-09 NOTE — PROGRESS NOTES
Assessment/Plan:    38 weeks gestation of pregnancy  1   labor precautions  contractions every 3-5 min apart for 1-2 hours   water breaks vaginal bleeding call or come to the office asap     Pt felt wet over the last few days   Nitrazine negative   Pooling negative   Ferning negative'   + hyphae noted consistent with yeast     2  GBS negative         Yeast infection  Superficial secondary to increase wetness    + hyphae noted consistent with yeast          Subjective:      Patient ID: Ronna Dorado is a 33 y o  female      HPI    Pt felt wet over the last few days, feels that her panties are wet   Not running down her leg  Has some superficial irritation around the introitus   Using diaper rash cream       Review of Systems      + itching and irrigation around th introitus   + fetal movement  + pressure no contractions    Objective:      /62 (BP Location: Left arm, Cuff Size: Standard)   Pulse (!) 112   Ht 5' 9" (1 753 m)   Wt 76 7 kg (169 lb 3 2 oz)   LMP 07/16/2017 (Exact Date)   BMI 24 99 kg/m²          Physical Exam      + fetal heart tones  Fundal height is 37cm  VE 1/50/-3 vertex / anterior     [employer] [place]:   Ferning negative'   + hyphae noted consistent with yeast

## 2018-04-09 NOTE — ASSESSMENT & PLAN NOTE
1   labor precautions  contractions every 3-5 min apart for 1-2 hours   water breaks vaginal bleeding call or come to the office asap     Pt felt wet over the last few days   Nitrazine negative   Pooling negative   Ferning negative'   + hyphae noted consistent with yeast     2    GBS negative

## 2018-04-10 PROBLEM — Z13.79 GENETIC TESTING: Status: ACTIVE | Noted: 2018-04-10

## 2018-04-15 ENCOUNTER — ANESTHESIA (INPATIENT)
Dept: LABOR AND DELIVERY | Facility: HOSPITAL | Age: 28
End: 2018-04-15
Payer: COMMERCIAL

## 2018-04-15 ENCOUNTER — ANESTHESIA EVENT (INPATIENT)
Dept: LABOR AND DELIVERY | Facility: HOSPITAL | Age: 28
End: 2018-04-15
Payer: COMMERCIAL

## 2018-04-15 ENCOUNTER — HOSPITAL ENCOUNTER (INPATIENT)
Facility: HOSPITAL | Age: 28
LOS: 3 days | Discharge: HOME | End: 2018-04-18
Attending: OBSTETRICS & GYNECOLOGY | Admitting: OBSTETRICS & GYNECOLOGY
Payer: COMMERCIAL

## 2018-04-15 DIAGNOSIS — Z3A.39 39 WEEKS GESTATION OF PREGNANCY: ICD-10-CM

## 2018-04-15 PROBLEM — O42.92 FULL-TERM PREMATURE RUPTURE OF MEMBRANES: Status: ACTIVE | Noted: 2018-04-15

## 2018-04-15 LAB
ABO GROUP BLD: NORMAL
ALBUMIN SERPL BCP-MCNC: 2.8 G/DL (ref 3.5–5)
ALP SERPL-CCNC: 90 U/L (ref 46–116)
ALT SERPL W P-5'-P-CCNC: 14 U/L (ref 12–78)
ANION GAP SERPL CALCULATED.3IONS-SCNC: 11 MMOL/L (ref 4–13)
AST SERPL W P-5'-P-CCNC: 21 U/L (ref 5–45)
BACTERIA UR QL AUTO: ABNORMAL /HPF
BASOPHILS # BLD AUTO: 0.02 THOUSANDS/ΜL (ref 0–0.1)
BASOPHILS NFR BLD AUTO: 0 % (ref 0–1)
BILIRUB SERPL-MCNC: 0.13 MG/DL (ref 0.2–1)
BILIRUB UR QL STRIP: NEGATIVE
BLD GP AB SCN SERPL QL: NEGATIVE
BUN SERPL-MCNC: 8 MG/DL (ref 5–25)
CALCIUM SERPL-MCNC: 8.6 MG/DL
CHLORIDE SERPL-SCNC: 105 MMOL/L (ref 100–108)
CLARITY UR: CLEAR
CO2 SERPL-SCNC: 22 MMOL/L (ref 21–32)
COLOR UR: YELLOW
CREAT SERPL-MCNC: 0.59 MG/DL (ref 0.6–1.3)
CREAT UR-MCNC: 51.3 MG/DL
EOSINOPHIL # BLD AUTO: 0.12 THOUSAND/ΜL (ref 0–0.61)
EOSINOPHIL NFR BLD AUTO: 1 % (ref 0–6)
ERYTHROCYTE [DISTWIDTH] IN BLOOD BY AUTOMATED COUNT: 13.9 % (ref 11.6–15.1)
GFR SERPL CREATININE-BSD FRML MDRD: 127 ML/MIN/1.73SQ M
GLUCOSE SERPL-MCNC: 104 MG/DL (ref 65–140)
GLUCOSE UR STRIP-MCNC: NEGATIVE MG/DL
HCT VFR BLD AUTO: 36.6 % (ref 34.8–46.1)
HGB BLD-MCNC: 12.4 G/DL (ref 11.5–15.4)
HGB UR QL STRIP.AUTO: ABNORMAL
KETONES UR STRIP-MCNC: NEGATIVE MG/DL
LEUKOCYTE ESTERASE UR QL STRIP: NEGATIVE
LYMPHOCYTES # BLD AUTO: 2.12 THOUSANDS/ΜL (ref 0.6–4.47)
LYMPHOCYTES NFR BLD AUTO: 22 % (ref 14–44)
MCH RBC QN AUTO: 30.9 PG (ref 26.8–34.3)
MCHC RBC AUTO-ENTMCNC: 33.9 G/DL (ref 31.4–37.4)
MCV RBC AUTO: 91 FL (ref 82–98)
MONOCYTES # BLD AUTO: 0.66 THOUSAND/ΜL (ref 0.17–1.22)
MONOCYTES NFR BLD AUTO: 7 % (ref 4–12)
NEUTROPHILS # BLD AUTO: 6.93 THOUSANDS/ΜL (ref 1.85–7.62)
NEUTS SEG NFR BLD AUTO: 70 % (ref 43–75)
NITRITE UR QL STRIP: NEGATIVE
NON-SQ EPI CELLS URNS QL MICRO: ABNORMAL /HPF
NRBC BLD AUTO-RTO: 0 /100 WBCS
PH UR STRIP.AUTO: 7.5 [PH] (ref 4.5–8)
PLATELET # BLD AUTO: 133 THOUSANDS/UL (ref 149–390)
PMV BLD AUTO: 11.6 FL (ref 8.9–12.7)
POTASSIUM SERPL-SCNC: 3.6 MMOL/L (ref 3.5–5.3)
PROT SERPL-MCNC: 6.6 G/DL (ref 6.4–8.2)
PROT UR STRIP-MCNC: NEGATIVE MG/DL
PROT UR-MCNC: 8 MG/DL
PROT/CREAT UR: 0.16 MG/G{CREAT} (ref 0–0.1)
RBC # BLD AUTO: 4.01 MILLION/UL (ref 3.81–5.12)
RBC #/AREA URNS AUTO: ABNORMAL /HPF
RH BLD: POSITIVE
SODIUM SERPL-SCNC: 138 MMOL/L (ref 136–145)
SP GR UR STRIP.AUTO: 1.01 (ref 1–1.03)
SPECIMEN EXPIRATION DATE: NORMAL
UROBILINOGEN UR QL STRIP.AUTO: 0.2 E.U./DL
WBC # BLD AUTO: 9.85 THOUSAND/UL (ref 4.31–10.16)
WBC #/AREA URNS AUTO: ABNORMAL /HPF

## 2018-04-15 PROCEDURE — 82570 ASSAY OF URINE CREATININE: CPT | Performed by: OBSTETRICS & GYNECOLOGY

## 2018-04-15 PROCEDURE — 80053 COMPREHEN METABOLIC PANEL: CPT | Performed by: OBSTETRICS & GYNECOLOGY

## 2018-04-15 PROCEDURE — 86900 BLOOD TYPING SEROLOGIC ABO: CPT | Performed by: OBSTETRICS & GYNECOLOGY

## 2018-04-15 PROCEDURE — 4A1HXCZ MONITORING OF PRODUCTS OF CONCEPTION, CARDIAC RATE, EXTERNAL APPROACH: ICD-10-PCS | Performed by: OBSTETRICS & GYNECOLOGY

## 2018-04-15 PROCEDURE — 86901 BLOOD TYPING SEROLOGIC RH(D): CPT | Performed by: OBSTETRICS & GYNECOLOGY

## 2018-04-15 PROCEDURE — 86592 SYPHILIS TEST NON-TREP QUAL: CPT | Performed by: OBSTETRICS & GYNECOLOGY

## 2018-04-15 PROCEDURE — 99202 OFFICE O/P NEW SF 15 MIN: CPT

## 2018-04-15 PROCEDURE — 85025 COMPLETE CBC W/AUTO DIFF WBC: CPT | Performed by: OBSTETRICS & GYNECOLOGY

## 2018-04-15 PROCEDURE — 81001 URINALYSIS AUTO W/SCOPE: CPT | Performed by: OBSTETRICS & GYNECOLOGY

## 2018-04-15 PROCEDURE — 10H07YZ INSERTION OF OTHER DEVICE INTO PRODUCTS OF CONCEPTION, VIA NATURAL OR ARTIFICIAL OPENING: ICD-10-PCS | Performed by: OBSTETRICS & GYNECOLOGY

## 2018-04-15 PROCEDURE — 84156 ASSAY OF PROTEIN URINE: CPT | Performed by: OBSTETRICS & GYNECOLOGY

## 2018-04-15 PROCEDURE — 86850 RBC ANTIBODY SCREEN: CPT | Performed by: OBSTETRICS & GYNECOLOGY

## 2018-04-15 RX ORDER — OXYTOCIN/RINGER'S LACTATE 30/500 ML
1-30 PLASTIC BAG, INJECTION (ML) INTRAVENOUS
Status: DISCONTINUED | OUTPATIENT
Start: 2018-04-15 | End: 2018-04-16

## 2018-04-15 RX ORDER — ONDANSETRON 2 MG/ML
4 INJECTION INTRAMUSCULAR; INTRAVENOUS EVERY 4 HOURS PRN
Status: DISCONTINUED | OUTPATIENT
Start: 2018-04-15 | End: 2018-04-16

## 2018-04-15 RX ORDER — CALCIUM CARBONATE 200(500)MG
1000 TABLET,CHEWABLE ORAL DAILY PRN
Status: DISCONTINUED | OUTPATIENT
Start: 2018-04-15 | End: 2018-04-16

## 2018-04-15 RX ORDER — ROPIVACAINE HYDROCHLORIDE 2 MG/ML
INJECTION, SOLUTION EPIDURAL; INFILTRATION; PERINEURAL AS NEEDED
Status: DISCONTINUED | OUTPATIENT
Start: 2018-04-15 | End: 2018-04-16 | Stop reason: SURG

## 2018-04-15 RX ORDER — SODIUM CHLORIDE, SODIUM LACTATE, POTASSIUM CHLORIDE, CALCIUM CHLORIDE 600; 310; 30; 20 MG/100ML; MG/100ML; MG/100ML; MG/100ML
125 INJECTION, SOLUTION INTRAVENOUS CONTINUOUS
Status: DISCONTINUED | OUTPATIENT
Start: 2018-04-15 | End: 2018-04-16

## 2018-04-15 RX ORDER — BUTORPHANOL TARTRATE 1 MG/ML
1 INJECTION, SOLUTION INTRAMUSCULAR; INTRAVENOUS ONCE
Status: COMPLETED | OUTPATIENT
Start: 2018-04-15 | End: 2018-04-15

## 2018-04-15 RX ORDER — ROPIVACAINE HYDROCHLORIDE 2 MG/ML
INJECTION, SOLUTION EPIDURAL; INFILTRATION; PERINEURAL
Status: COMPLETED
Start: 2018-04-15 | End: 2018-04-15

## 2018-04-15 RX ADMIN — ONDANSETRON 4 MG: 2 INJECTION INTRAMUSCULAR; INTRAVENOUS at 21:44

## 2018-04-15 RX ADMIN — Medication 1000 MG: at 19:44

## 2018-04-15 RX ADMIN — Medication 2 MILLI-UNITS/MIN: at 11:28

## 2018-04-15 RX ADMIN — BUTORPHANOL TARTRATE 1 MG: 1 INJECTION, SOLUTION INTRAMUSCULAR; INTRAVENOUS at 15:01

## 2018-04-15 RX ADMIN — ROPIVACAINE HYDROCHLORIDE: 2 INJECTION, SOLUTION EPIDURAL; INFILTRATION at 16:46

## 2018-04-15 RX ADMIN — SODIUM CHLORIDE, SODIUM LACTATE, POTASSIUM CHLORIDE, AND CALCIUM CHLORIDE 125 ML/HR: .6; .31; .03; .02 INJECTION, SOLUTION INTRAVENOUS at 18:04

## 2018-04-15 RX ADMIN — ROPIVACAINE HYDROCHLORIDE 5 ML: 2 INJECTION, SOLUTION EPIDURAL; INFILTRATION at 16:36

## 2018-04-15 RX ADMIN — SODIUM CHLORIDE, SODIUM LACTATE, POTASSIUM CHLORIDE, AND CALCIUM CHLORIDE 125 ML/HR: .6; .31; .03; .02 INJECTION, SOLUTION INTRAVENOUS at 18:05

## 2018-04-15 RX ADMIN — ROPIVACAINE HYDROCHLORIDE 5 ML: 2 INJECTION, SOLUTION EPIDURAL; INFILTRATION at 16:37

## 2018-04-15 RX ADMIN — SODIUM CHLORIDE, SODIUM LACTATE, POTASSIUM CHLORIDE, AND CALCIUM CHLORIDE 125 ML/HR: .6; .31; .03; .02 INJECTION, SOLUTION INTRAVENOUS at 22:40

## 2018-04-15 RX ADMIN — SODIUM CHLORIDE, SODIUM LACTATE, POTASSIUM CHLORIDE, AND CALCIUM CHLORIDE 125 ML/HR: .6; .31; .03; .02 INJECTION, SOLUTION INTRAVENOUS at 11:12

## 2018-04-15 NOTE — H&P
H&P Exam - Obstetrics   Lyndsey Carranza 33 y o  female MRN: [id number]  Unit/Bed#: L&D 329-02 Encounter: [id number]    >2 Midnights    INPATIENT     History of Present Illness   Chief Complaint: PROM    HPI:  Lyndsey Carranza is a 33 y o   female with an ENEDELIA of 2018, by Last Menstrual Period at 39w0d weeks gestation who is being admitted for PROM  Contractions: None  Leakage of fluid: SROM clear @07:45  Bleeding: None  Fetal movement: present  nonsevere elevated BP on arrival  Pt attributes to being nervous  Repeat BP WNL  Pt denies headache, blurry vision, RUQ/ epigastric pain, no prior BP issues  Her current obstetrical history has been uncomplicated    Review of Systems  Negative except as noted in HPI    Historical Information   OB History    Para Term  AB Living   1 0 0 0 0 0   SAB TAB Ectopic Multiple Live Births   0 0 0 0 0      # Outcome Date GA Lbr Reinaldo/2nd Weight Sex Delivery Anes PTL Lv   1 Current                 Baby complications/comments: none  Past Medical History:   Diagnosis Date     1 para 1     Hyperemesis affecting pregnancy, antepartum      Past Surgical History:   Procedure Laterality Date    NASAL SINUS SURGERY      WISDOM TOOTH EXTRACTION      LEFT LOWER REMOVED     Social History   History   Alcohol Use No     History   Drug Use No     History   Smoking Status    Never Smoker   Smokeless Tobacco    Never Used     Family History: non-contributory    Meds/Allergies   {  Prescriptions Prior to Admission   Medication    ondansetron (ZOFRAN) 8 mg tablet    clotrimazole-betamethasone (LOTRISONE) 1-0 05 % cream    Prenatal Vit-Fe Fumarate-FA (PRENATAL 1+1 PO)     No Known Allergies    Objective   Vitals: Blood pressure 130/80, pulse (!) 110, temperature 98 9 °F (37 2 °C), temperature source Oral, resp  rate 16, height 5' 8" (1 727 m), weight 76 7 kg (169 lb), last menstrual period 2017, currently breastfeeding  Body mass index is 25 7 kg/m²  Invasive Devices          No matching active lines, drains, or airways          Physical Exam   Constitutional: She is oriented to person, place, and time  She appears well-developed and well-nourished  HENT:   Head: Normocephalic and atraumatic  Cardiovascular: Normal rate and regular rhythm  Pulmonary/Chest: Effort normal    Abdominal: Soft  There is no tenderness  gravid   Musculoskeletal: She exhibits no edema or tenderness  Neurological: She is alert and oriented to person, place, and time  Psychiatric: She has a normal mood and affect  Her behavior is normal      Vaginal Exam  Dilation: 1  Effacement (%): 50  Cervical Characteristics: Mid-position, Firm  Station: -2  Presentation: Vertex  Position: Unknown  Method: Manual  OB Examiner: Dr France   SSE: grossly ruptured, nitrazine +, ferning +  U/s: VTX, anterior placenta, LUISA 5 39cm  Membranes: Ruptured  FHR: 135, cat I  Lucas: no ctx    Prenatal Labs: I have personally reviewed pertinent reports  all labs WNL, GBS negative    Assessment/Plan     Assessment:   at 39w0d, PROM    Plan:  Admit for IOL for PROM via pitocin; consent signed  IV LR  Routine + preeclamptic labs; monitor for recurring elevated BPs  Epidural or IV pain medications on request    D/w Dr Jessenia Cisneros

## 2018-04-15 NOTE — OB LABOR/OXYTOCIN SAFETY PROGRESS
Oxytocin Safety Progress Check Note - Shilpi Montanez 33 y o  female MRN: [id number]    Unit/Bed#: L&D 325-01 Encounter: [id number]    Obstetric History       T0      L0     SAB0   TAB0   Ectopic0   Multiple0   Live Births0      Gestational Age: 39w0d  Dose (subhash-units/min) Oxytocin: 24 subhash-units/min  Contraction Frequency (minutes): 2-3  Contraction Quality: Mild ()  Tachysystole: No   Dilation: 1-2        Effacement (%): 80  Station: -2  Baseline Rate: 120 bpm  Fetal Heart Rate: 130 BPM  FHR Category: Category I     Oxytocin Safety Progress Check: Safety check completed    Notes/comments:   Patient comfortable in bed with epidural  Some cervical change since last exam  Pit @26 in room  Continue pitocin titration up to 30 to obtain MVU >200  Will d/w Dr Tabitha Stanton MD 4/15/2018 6:49 PM

## 2018-04-15 NOTE — OB LABOR/OXYTOCIN SAFETY PROGRESS
Oxytocin Safety Progress Check Note - Yoana Pollack 33 y o  female MRN: [id number]    Unit/Bed#: L&D 325-01 Encounter: [id number]    Obstetric History       T0      L0     SAB0   TAB0   Ectopic0   Multiple0   Live Births0      Gestational Age: 39w0d  Dose (subhash-units/min) Oxytocin: 12 subhash-units/min  Contraction Frequency (minutes): 3  Contraction Quality: Mild  Tachysystole: No   Dilation: 1        Effacement (%): 80  Station: -2  Baseline Rate: 125 bpm  Fetal Heart Rate: 130 BPM  FHR Category: Category I     Oxytocin Safety Progress Check: Safety check completed    Notes/comments: patient sitting up in bed feeling stronger contractions, desires IV pain medication, will give stadol 1mg  FHR tracing had intermittent late within the past hour that resolved with position change and has been cat I for the last 30 minutes  Continue pitocin titration  D/w Dr Skylar Santiago  Preeclamptic labs from admission reviewed and were WNL  No further BP elevations since arrival  Will continue to monitor               Laurent Vo MD 4/15/2018 2:28 PM

## 2018-04-15 NOTE — OB LABOR/OXYTOCIN SAFETY PROGRESS
Oxytocin Safety Progress Check Note - Alverto Grupo 33 y o  female MRN: [id number]    Unit/Bed#: L&D 325-01 Encounter: [id number]    Obstetric History       T0      L0     SAB0   TAB0   Ectopic0   Multiple0   Live Births0      Gestational Age: 39w0d  Dose (subhash-units/min) Oxytocin: 18 subhash-units/min  Contraction Frequency (minutes): 2-3  Contraction Quality: Moderate  Tachysystole: No   Dilation: 1        Effacement (%): 80  Station: -2  Baseline Rate: 120 bpm  Fetal Heart Rate: 130 BPM  FHR Category: Category I     Oxytocin Safety Progress Check: Safety check completed    Notes/comments: pt feeling stronger ctx, pt desires epidural; will notify anesthesia  IUPC placed as ctx were pickign up inverted & not well while on pt side  Continue pitocin titration for MVU >200  D/w Dr Clau Aiken MD 4/15/2018 4:26 PM

## 2018-04-15 NOTE — ANESTHESIA PROCEDURE NOTES
Epidural Block    Patient location during procedure: OB  Start time: 4/15/2018 4:36 PM  Reason for block: at surgeon's request  Staffing  Anesthesiologist: Kayleigh Horn  Performed: anesthesiologist   Preanesthetic Checklist  Completed: patient identified, site marked, surgical consent, pre-op evaluation, timeout performed, IV checked, risks and benefits discussed and monitors and equipment checked  Epidural  Patient position: sitting  Prep: Betadine  Patient monitoring: heart rate and frequent blood pressure checks  Approach: midline  Location: lumbar (1-5)  Injection technique: MARLON air  Needle  Needle type: Tuohy   Needle gauge: 18 G  Catheter type: side hole  Catheter size: 20 G  Test dose: negative  Assessment  Sensory level: [id number] aspiration for CSF, negative aspiration for heme and no paresthesia on injection  patient tolerated the procedure well with no immediate complications

## 2018-04-15 NOTE — OB LABOR/OXYTOCIN SAFETY PROGRESS
Oxytocin Safety Progress Check Note - Li Reyes 33 y o  female MRN: [id number]    Unit/Bed#: L&D 325-01 Encounter: [id number]    Obstetric History       T0      L0     SAB0   TAB0   Ectopic0   Multiple0   Live Births0      Gestational Age: 39w0d  Dose (subhash-units/min) Oxytocin: 8 subhash-units/min  Contraction Frequency (minutes): 3-4  Contraction Quality: Mild  Tachysystole: No   Dilation:  (SVE deferred)        Effacement (%): 50  Station: -2  Baseline Rate: 120 bpm  Fetal Heart Rate: 130 BPM  FHR Category: Category I     Oxytocin Safety Progress Check: Safety check completed    Notes/comments: patient sitting up comfortably in bed starting to feel regular ctx  Declines pain managmetnt  SVE deferred  D/w Dr Nathanael Ramachandran, will check cervix in ~1hr and continue pitocin titration               Sheri Dash MD 4/15/2018 1:28 PM

## 2018-04-15 NOTE — ANESTHESIA PREPROCEDURE EVALUATION
Review of Systems/Medical History  Patient summary reviewed  Chart reviewed      Cardiovascular  Exercise tolerance: good,     Pulmonary  Negative pulmonary ROS        GI/Hepatic  Negative GI/hepatic ROS          Negative  ROS        Endo/Other  Negative endo/other ROS      GYN  Negative gynecology ROS          Hematology  Negative hematology ROS      Musculoskeletal  Negative musculoskeletal ROS        Neurology  Negative neurology ROS      Psychology   Negative psychology ROS              Physical Exam    Airway    Mallampati score: I  TM Distance: <3 FB  Neck ROM: full     Dental       Cardiovascular  Rhythm: regular, Rate: normal,     Pulmonary  Breath sounds clear to auscultation,     Other Findings        Anesthesia Plan  ASA Score- 2     Anesthesia Type- epidural with ASA Monitors  Additional Monitors:   Airway Plan:         Plan Factors- Patient instructed to abstain from smoking on day of procedure  Patient did not smoke on day of surgery  Induction-     Postoperative Plan-     Informed Consent- Anesthetic plan and risks discussed with patient

## 2018-04-16 PROBLEM — O41.1230 CHORIOAMNIONITIS IN THIRD TRIMESTER: Status: ACTIVE | Noted: 2018-04-16

## 2018-04-16 LAB — RPR SER QL: NORMAL

## 2018-04-16 PROCEDURE — 59400 OBSTETRICAL CARE: CPT | Performed by: OBSTETRICS & GYNECOLOGY

## 2018-04-16 PROCEDURE — 88307 TISSUE EXAM BY PATHOLOGIST: CPT | Performed by: PATHOLOGY

## 2018-04-16 PROCEDURE — 0UQMXZZ REPAIR VULVA, EXTERNAL APPROACH: ICD-10-PCS | Performed by: OBSTETRICS & GYNECOLOGY

## 2018-04-16 PROCEDURE — 0HQ9XZZ REPAIR PERINEUM SKIN, EXTERNAL APPROACH: ICD-10-PCS | Performed by: OBSTETRICS & GYNECOLOGY

## 2018-04-16 PROCEDURE — 99024 POSTOP FOLLOW-UP VISIT: CPT | Performed by: OBSTETRICS & GYNECOLOGY

## 2018-04-16 RX ORDER — GENTAMICIN SULFATE 100 MG/100ML
1.5 INJECTION, SOLUTION INTRAVENOUS EVERY 8 HOURS
Status: DISCONTINUED | OUTPATIENT
Start: 2018-04-16 | End: 2018-04-16

## 2018-04-16 RX ORDER — DIPHENHYDRAMINE HCL 25 MG
25 TABLET ORAL EVERY 6 HOURS PRN
Status: DISCONTINUED | OUTPATIENT
Start: 2018-04-16 | End: 2018-04-18 | Stop reason: HOSPADM

## 2018-04-16 RX ORDER — METHYLERGONOVINE MALEATE 0.2 MG/ML
INJECTION INTRAVENOUS
Status: COMPLETED
Start: 2018-04-16 | End: 2018-04-16

## 2018-04-16 RX ORDER — DOCUSATE SODIUM 100 MG/1
100 CAPSULE, LIQUID FILLED ORAL 2 TIMES DAILY
Status: DISCONTINUED | OUTPATIENT
Start: 2018-04-16 | End: 2018-04-18 | Stop reason: HOSPADM

## 2018-04-16 RX ORDER — GENTAMICIN SULFATE 100 MG/100ML
1.5 INJECTION, SOLUTION INTRAVENOUS EVERY 8 HOURS
Status: COMPLETED | OUTPATIENT
Start: 2018-04-16 | End: 2018-04-16

## 2018-04-16 RX ORDER — OXYCODONE HYDROCHLORIDE AND ACETAMINOPHEN 5; 325 MG/1; MG/1
1 TABLET ORAL EVERY 4 HOURS PRN
Status: DISCONTINUED | OUTPATIENT
Start: 2018-04-16 | End: 2018-04-18 | Stop reason: HOSPADM

## 2018-04-16 RX ORDER — CARBOPROST TROMETHAMINE 250 UG/ML
INJECTION, SOLUTION INTRAMUSCULAR
Status: DISCONTINUED
Start: 2018-04-16 | End: 2018-04-16 | Stop reason: WASHOUT

## 2018-04-16 RX ORDER — IBUPROFEN 600 MG/1
600 TABLET ORAL EVERY 6 HOURS PRN
Status: DISCONTINUED | OUTPATIENT
Start: 2018-04-16 | End: 2018-04-18 | Stop reason: HOSPADM

## 2018-04-16 RX ORDER — DIAPER,BRIEF,INFANT-TODD,DISP
1 EACH MISCELLANEOUS 4 TIMES DAILY PRN
Status: DISCONTINUED | OUTPATIENT
Start: 2018-04-16 | End: 2018-04-18 | Stop reason: HOSPADM

## 2018-04-16 RX ORDER — METHYLERGONOVINE MALEATE 0.2 MG/ML
0.2 INJECTION INTRAVENOUS ONCE
Status: COMPLETED | OUTPATIENT
Start: 2018-04-16 | End: 2018-04-16

## 2018-04-16 RX ORDER — ACETAMINOPHEN 325 MG/1
650 TABLET ORAL EVERY 6 HOURS PRN
Status: DISCONTINUED | OUTPATIENT
Start: 2018-04-16 | End: 2018-04-18 | Stop reason: HOSPADM

## 2018-04-16 RX ORDER — ONDANSETRON 2 MG/ML
4 INJECTION INTRAMUSCULAR; INTRAVENOUS EVERY 8 HOURS PRN
Status: DISCONTINUED | OUTPATIENT
Start: 2018-04-16 | End: 2018-04-18 | Stop reason: HOSPADM

## 2018-04-16 RX ORDER — CALCIUM CARBONATE 200(500)MG
500 TABLET,CHEWABLE ORAL 3 TIMES DAILY PRN
Status: DISCONTINUED | OUTPATIENT
Start: 2018-04-16 | End: 2018-04-18 | Stop reason: HOSPADM

## 2018-04-16 RX ORDER — ACETAMINOPHEN 325 MG/1
650 TABLET ORAL EVERY 6 HOURS PRN
Status: DISCONTINUED | OUTPATIENT
Start: 2018-04-16 | End: 2018-04-16

## 2018-04-16 RX ADMIN — IBUPROFEN 600 MG: 600 TABLET ORAL at 19:04

## 2018-04-16 RX ADMIN — GENTAMICIN SULFATE 100 MG: 100 INJECTION, SOLUTION INTRAVENOUS at 01:15

## 2018-04-16 RX ADMIN — METHYLERGONOVINE MALEATE 0.2 MG: 0.2 INJECTION, SOLUTION INTRAMUSCULAR; INTRAVENOUS at 03:41

## 2018-04-16 RX ADMIN — METHYLERGONOVINE MALEATE 0.2 MG: 0.2 INJECTION INTRAVENOUS at 03:41

## 2018-04-16 RX ADMIN — HYDROCORTISONE 1 APPLICATION: 1 CREAM TOPICAL at 05:10

## 2018-04-16 RX ADMIN — GENTAMICIN SULFATE 100 MG: 100 INJECTION, SOLUTION INTRAVENOUS at 09:24

## 2018-04-16 RX ADMIN — WITCH HAZEL 1 PAD: 500 SOLUTION RECTAL; TOPICAL at 05:10

## 2018-04-16 RX ADMIN — BENZOCAINE AND LEVOMENTHOL: 200; 5 SPRAY TOPICAL at 05:10

## 2018-04-16 RX ADMIN — AMPICILLIN SODIUM 2000 MG: 2 INJECTION, POWDER, FOR SOLUTION INTRAMUSCULAR; INTRAVENOUS at 01:54

## 2018-04-16 RX ADMIN — Medication 2000 MG: at 10:37

## 2018-04-16 RX ADMIN — ROPIVACAINE HYDROCHLORIDE: 2 INJECTION, SOLUTION EPIDURAL; INFILTRATION at 00:34

## 2018-04-16 RX ADMIN — IBUPROFEN 600 MG: 600 TABLET ORAL at 12:49

## 2018-04-16 RX ADMIN — ACETAMINOPHEN 650 MG: 325 TABLET, FILM COATED ORAL at 01:13

## 2018-04-16 RX ADMIN — IBUPROFEN 600 MG: 600 TABLET ORAL at 07:11

## 2018-04-16 NOTE — OB LABOR/OXYTOCIN SAFETY PROGRESS
Oxytocin Safety Progress Check Note - Britney Rodrigues 33 y o  female MRN: [id number]    Unit/Bed#: L&D 325-01 Encounter: [id number]    Obstetric History       T0      L0     SAB0   TAB0   Ectopic0   Multiple0   Live Births0      Gestational Age: 39w0d  Dose (subhash-units/min) Oxytocin: 30 subhash-units/min  Contraction Frequency (minutes): 2  Contraction Quality: Moderate  Tachysystole: No   Dilation: 4        Effacement (%): 80  Station: -1  Baseline Rate: 140 bpm  Fetal Heart Rate: 130 BPM  FHR Category: Category II (mod variability, +accels, intermittent variable & late decels)     Oxytocin Safety Progress Check: Safety check completed    Notes/comments:  Patient comfortable in bed with epidural  Excellent cervical change since last exam  Intermittent variable & late decels  Will do position changes and decreased pitocin from 30 to 14  If persistent variable decels will start amnioinfusion  D/w Dr Agustín Lawrence MD 4/15/2018 9:24 PM

## 2018-04-16 NOTE — PROGRESS NOTES
Cat 2 Huddle:    Participants: Dr Nathanael Ramachandran, Dr Esther Walters    Reason: pitocin off for cat II with peristent lates    History:   61-year-old  at 39 0 weeks on admission with PROM (SROM @07:45 4/15)  She was admitted for induction of labor for PROM with Pitocin titration  She received 1 dose of Stadol followed by an epidural for pain management  An IUPC had been placed in her labor course to better assist Pitocin titration  Patient has continued to make change in her cervical exam from 1 cm at admission to now 7 cm dilated  Patient developed a fever of 100 8F,  While there was no maternal or fetal tachycardia she was approaching the time frame for prolonged rupture of membrane and the decision was made to treat empirically for chorioamnionitis       FHT:    CVX: Dilation: 7  Effacement (%): 90  Station: 0 (ROT)  Cervical Characteristics: Mid-position, Firm    [place]: Contraction Frequency (minutes): 1-3  Contraction Duration (seconds): 50-70  Contraction Quality: Moderate    Plan:  1) chorioamnionitis-   IV fluid bolus, Tylenol, IV ampicillin and gentamicin,  Notified  staff  2)  Category 2 fetal heart rate tracing-  Discontinue Pitocin  as OB staff will be occupied in the OR for another patient,  IV fluids, position changes    Sheri Dash MD  2018  1:00 AM

## 2018-04-16 NOTE — OB LABOR/OXYTOCIN SAFETY PROGRESS
Oxytocin Safety Progress Check Note - Charles Bauman 33 y o  female MRN: [id number]    Unit/Bed#: L&D 325-01 Encounter: [id number]    Obstetric History       T0      L0     SAB0   TAB0   Ectopic0   Multiple0   Live Births0      Gestational Age: 39w0d  Dose (subhash-units/min) Oxytocin: 14 subhash-units/min (per physician order)  Contraction Frequency (minutes): 2  Contraction Quality: Moderate ()  Tachysystole: No   Dilation:  (SVE deferred)        Effacement (%): 80  Station: -1  Baseline Rate: 150 bpm  Fetal Heart Rate: 130 BPM  FHR Category: Category I     Oxytocin Safety Progress Check: Safety check completed    Notes/comments: patient comfortable in bed  Exam deferred  Will recheck as needed for increased pressure/ urge to push or as otherwise clinically indicated  Pt had previous intermittent late decels that resolved with position change  FHR for the past 30 min has been cat I                Kita Arthur MD 4/15/2018 11:31 PM

## 2018-04-16 NOTE — DISCHARGE INSTRUCTIONS
Vaginal Delivery   WHAT YOU SHOULD KNOW:   A vaginal delivery is the birth of your baby through your vagina (birth canal)  AFTER YOU LEAVE:   Medicines:  · NSAIDs  help decrease swelling and pain or fever  This medicine is available with or without a doctor's order  NSAIDs can cause stomach bleeding or kidney problems in certain people  If you take blood thinner medicine, always ask your healthcare provider if NSAIDs are safe for you  Always read the medicine label and follow directions  · Take your medicine as directed  Call your healthcare provider if you think your medicine is not helping or if you have side effects  Tell him if you are allergic to any medicine  Keep a list of the medicines, vitamins, and herbs you take  Include the amounts, and when and why you take them  Bring the list or the pill bottles to follow-up visits  Carry your medicine list with you in case of an emergency  Follow up with your primary healthcare provider:  Most women need to return 6 weeks after a vaginal delivery  Ask about how to care for your wounds or stitches  Write down your questions so you remember to ask them during your visits  Activity:  Rest as much as possible  Try to keep all activities short  You may be able to do some exercise soon after you have your baby  Talk with your primary healthcare provider before you start exercising  If you work outside the home, ask when you can return to your job  Kegel exercises:  Kegel exercises may help your vaginal and rectal muscles heal faster  You can do Kegel exercises by tightening and relaxing the muscles around your vagina  Kegel exercises help make the muscles stronger  Breast care:  When your milk comes in, your breasts may feel full and hard  Ask how to care for your breasts, even if you are not breastfeeding  Constipation:  Do not try to push the bowel movement out if it is too hard   High-fiber foods, extra liquids, and regular exercise can help you prevent constipation  Examples of high-fiber foods are fruit and bran  Prune juice and water are good liquids to drink  Regular exercise helps your digestive system work  You may also be told to take over-the-counter fiber and stool softener medicines  Take these items as directed  Hemorrhoids:  Pregnancy can cause severe hemorrhoids  You may have rectal pain because of the hemorrhoids  Ask how to prevent or treat hemorrhoids  Perineum care: Your perineum is the area between your vagina and anus  Keep the area clean and dry to help it heal and to prevent infection  Wash the area gently with soap and water when you bathe or shower  Rinse your perineum with warm water when you use the toilet  Your primary healthcare provider may suggest you use a warm sitz bath to help decrease pain  A sitz bath is a bathtub or basin filled to hip level  Stay in the sitz bath for 20 to 30 minutes, or as directed  Vaginal discharge: You will have vaginal discharge, called lochia, after your delivery  The lochia is bright red the first day or two after the birth  By the fourth day, the amount decreases, and it turns red-brown  Use a sanitary pad rather than a tampon to prevent a vaginal infection  It is normal to have lochia up to 8 weeks after your baby is born  Monthly periods: Your period may start again within 7 to 12 weeks after your baby is born  If you are breastfeeding, it may take longer for your period to start again  You can still get pregnant again even though you do not have your monthly period  Talk with your primary healthcare provider about a birth control method that will be good for you if you do not want to get pregnant  Mood changes: Many new mothers have some kind of mood changes after delivery  Some of these changes occur because of lack of sleep, hormone changes, and caring for a new baby  Some mood changes can be more serious, such as postpartum depression   Talk with your primary healthcare provider if you feel unable to care for yourself or your baby  Sexual activity:  You may need to avoid sex for 6 to 7 weeks after you have your baby  You may notice you have a decreased desire for sex, or sex may be painful  You may need to use a vaginal lubricant (gel) to help make sex more comfortable  Contact your primary healthcare provider if:   · You have heavy vaginal bleeding that fills 1 or more sanitary pads in 1 hour  · You have a fever  · Your pain does not go away, or gets worse  · The skin between your vagina and rectum is swollen, warm, or red  · You have swollen, hard, or painful breasts  · You feel very sad or depressed  · You feel more tired than usual      · You have questions or concerns about your condition or care  Seek care immediately or call 911 if:   · You have pus or yellow drainage coming from your vagina or wound  · You are urinating very little, or not at all  · Your arm or leg feels warm, tender, and painful  It may look swollen and red  · You feel lightheaded, have sudden and worsening chest pain, or trouble breathing  You may have more pain when you take deep breaths or cough, or you may cough up blood  © 2014 [address] is for End User's use only and may not be sold, redistributed or otherwise used for commercial purposes  All illustrations and images included in CareNotes® are the copyrighted property of [employer]  or [employer]  The above information is an  only  It is not intended as medical advice for individual conditions or treatments  Talk to your doctor, nurse or pharmacist before following any medical regimen to see if it is safe and effective for you

## 2018-04-16 NOTE — OB LABOR/OXYTOCIN SAFETY PROGRESS
Oxytocin Safety Progress Check Note - Leandro Perez 33 y o  female MRN: [id number]    Unit/Bed#: L&D 325-01 Encounter: [id number]    Obstetric History       T0      L0     SAB0   TAB0   Ectopic0   Multiple0   Live Births0      Gestational Age: 39w1d  Dose (subhash-units/min) Oxytocin: 15 subhash-units/min (per physician order)  Contraction Frequency (minutes): 1-3  Contraction Quality: Moderate  Tachysystole: No   Dilation: 7        Effacement (%): 90  Station: 0 (ROT)  Baseline Rate: 150 bpm  Fetal Heart Rate: 130 BPM  FHR Category: Category II (persistent late decels, improved with position change)     Oxytocin Safety Progress Check: Safety check completed    Notes/comments: patient comfortable in bed with epidural  Excellent change in SVE  Persistent late decels that improved with position change  Maternal temp 100 8F with no fetal or maternal tachycardia but prolonged ROM, will give tylenol, fluid bolus, and will give antibiotics for chorioamnionitis  Will also discontinue pitocin as OB team will be otherwise occupied in the OR  D/w Dr Sven Benedict MD 2018 12:57 AM

## 2018-04-16 NOTE — L&D DELIVERY NOTE
DELIVERY NOTE  Ronna Constant 33 y o  female MRN: [id number]  Unit/Bed#: L&D 325-01 Encounter: [id number]    Obstetrician:   Dr Niurka Frias    Assistant: Dr Enrique Bhat    Pre-Delivery Diagnosis: Term pregnancy  Induced labor  Single fetus  PROM  chorioamnionitis    Post-Delivery Diagnosis: Same as above - Delivered  Viable female fetus, apgar 9/9  1st degree laceration  Bilateral Labial tears  Cord blood analysis was unable to be performed secondary to inability to collect sample after delayed cord clamping    Procedure: Spontaneous vaginal delivery  Repair 1st degree and bilateral labial spontaneous laceration  s    Indications for instrumental delivery: None    Estimated Blood Loss:  362            Complications:  Chorioamnionitis    Description of Delivery: Patient delivered a viable Female  over intact perineum  A nuchal cord was not noted  With the assistance of maternal expulsive efforts and gentle downward traction of fetal head, the anterior shoulder was delivered without difficulty, followed by the remainder of the infant's body  After delivery of the , delayed cord clamping was  initiated for 30 seconds as the  was assessed by  staff for routine care at the bedside  The umbilical cord was then doubly clamped and cut  Umbilical cord blood and umbilical artery and venous gases were attempted to be collected without success; these were reattempted after delivery of the placenta from the placenta vessels without success  Placenta was delivered with fundal massage and gentle traction on the cord with active management of the third stage of labor  Placenta delivered intact with a short 3-vessel cord and was sent to pathology given the suspected chorioamnionitis  Active management of the third stage of labor was undertaken with IV pitocin  Bimanual exam was performed after a gush  Of bleeding and a dose of Methergine was provided IM in addition to the standard Pitocin    Following additional uterine massage and the administration of these medications, bleeding was noted to be under control with improved uterine tone  Inspection of the perineum, vagina, labia, and urethra revealed 1st degree and bilateral labial lacerations which were then repaired  with interrupted sutures of 3 0 Vicryl  The lacerations showed good tissue reapproximation and hemostasis  Mother and baby are currently recovering nicely in stable condition   was admitted to the  nursery  Attending Attestation:   Dr Deanna Mcadams was present for the entire procedure

## 2018-04-16 NOTE — OB LABOR/OXYTOCIN SAFETY PROGRESS
Labor Progress Note - Lyndsey Carranza 33 y o  female MRN: [id number]    Unit/Bed#: L&D 325-01 Encounter: [id number]    Obstetric History       T0      L0     SAB0   TAB0   Ectopic0   Multiple0   Live Births0      Gestational Age: 39w1d  Dose (subhash-units/min) Oxytocin: 0 subhash-units/min  Contraction Frequency (minutes): 2-4  Contraction Quality: Moderate  Tachysystole: No   Dilation: Lip/rim (Comment)        Effacement (%): 100  Station: 1, ROP  Baseline Rate: 150 bpm  Fetal Heart Rate: 130 BPM  FHR Category: Category I     Oxytocin Safety Progress Check: Safety check completed    Notes/comments:  Patient comfortable in bed feeling increased pressure  Continued excellent cervical progression since last exam    Continue expectant management  Discussed with Dr Valentina Rawls who is en route  Fetal heart rate tracing had 1 late deceleration since the last Huddle note and has otherwise been reassuring  FHR tracing has been cat I for the past 30 minutes               Dae Moreau MD 2018 2:58 AM

## 2018-04-16 NOTE — DISCHARGE SUMMARY
Discharge Summary -   Manuel Sahu 33 y o  female MRN: [id number]  Unit/Bed#: L&D 325-01 Encounter: [id number]    Admission Date: 4/15/2018     Discharge Date: 18    Attending: Dr Dona Elkins (admission, delivery)    Principal Diagnosis: IUP @39 0 wk on admit, PROM    Secondary Diagnosis: chorioamnionitis    Procedures: spontaneous vaginal delivery    Hospital Course: She was admitted for induction of labor for PROM (SROM 07:45 4/15, 1cm dilated) with Pitocin titration  She received 1 dose of Stadol followed by an epidural for pain management  An IUPC had been placed in her labor course to better assist Pitocin titration  Patient developed a fever of 100 8F, and was treated for chorioamnionitis  with ampicillin and gentamicin which was continued for 1 additional dose postpartum  Patient was complete and began pushing at 03:15  After pushing for 19 minutes, the patient delivered a viable female  on 18 @)3:34, APGAR 9/9, 6lbs 12oz  She underwent normal post delivery care  She was discharged home on postpartum day #2  She is ambulating well, voiding on her own, passing flatus, and tolerating oral intake  Complications: chorioamnionitis    Condition at discharge: good     Discharge Medications: For a complete list of the patient's medications, please refer to her med rec  Discharge instructions/Information to patient and family:   See after visit summary for information provided to patient and family  Provisions for Follow-Up Care:  See after visit summary for information related to follow-up care and any pertinent home health orders  Disposition: See After Visit Summary for discharge disposition information      Planned Readmission: No    Keya Bar MD  OB/GYN PGY-2  2018 6:05 AM

## 2018-04-16 NOTE — ANESTHESIA POSTPROCEDURE EVALUATION
Post-Op Assessment Note      CV Status:  Stable    Mental Status:  Alert and awake    Hydration Status:  Euvolemic    PONV Controlled:  Controlled    Airway Patency:  Patent    Post Op Vitals Reviewed: Yes          Staff: Anesthesiologist     Post-op block assessment: site cleaned and no complications        BP      Temp      Pulse     Resp      SpO2

## 2018-04-16 NOTE — CASE MANAGEMENT
Notification of Inpatient Admission/Inpatient Authorization Request  This is a Notification of Inpatient Admission/Request for Inpatient Authorization to our facility [address]  Please be advised that this patient is currently in our facility under Inpatient Status  Below you will find the Attending Physician and Facilitys information including NPI# and contact information for the Utilization  assigned to the [facility] where the patient is receiving services  Please feel free to contact the Utilization Review Department with any questions  Patient Information:  PATIENT NAME: Erica Grier  MRN: [id number]  YOB: 1990    PRESENTATION DATE: 4/15/2018  9:57 AM  IP ADMISSION DATE: 4/15/18 1049  DISCHARGE DATE: No discharge date for patient encounter  DISPOSITION: Home/Self Care    Attending Physician:  SINDI Lyle  Specialty- Obstetrics and Gynecology  [facility] ID- [id number]  [address] [place] [address],[address]  [place], [address]  Phone 1: [phone]  Fax: [phone]  Facility:  [address] [place] [address], [facility], [address]  [phone]  Tax ID: [id number]  NPI: [id number]    [address] [facility] in the [facility] by [employer] for 2017  Network Utilization Review Department  Phone: [phone]; Fax [phone]  ATTENTION: The Network Utilization Review Department is now centralized for our 7 Facilities  Make a note that we have a new phone and fax numbers for our Department  Please call with any questions or concerns to [phone] and carefully follow the prompts so that you are directed to the right person  All voicemails are confidential  Fax any determinations, approvals, denials, and requests for initial or continue stay review clinical to [phone]   Due to HIGH CALL volume, it would be easier if you could please send faxed requests to expedite your requests and in part, help us provide discharge notifications faster

## 2018-04-17 PROCEDURE — 99024 POSTOP FOLLOW-UP VISIT: CPT | Performed by: OBSTETRICS & GYNECOLOGY

## 2018-04-17 RX ADMIN — IBUPROFEN 600 MG: 600 TABLET ORAL at 16:28

## 2018-04-17 RX ADMIN — IBUPROFEN 600 MG: 600 TABLET ORAL at 11:01

## 2018-04-17 RX ADMIN — Medication 1 TABLET: at 09:21

## 2018-04-17 RX ADMIN — IBUPROFEN 600 MG: 600 TABLET ORAL at 04:48

## 2018-04-17 NOTE — PROGRESS NOTES
Progress Note - OB/GYN   Lyndsey Carranza 33 y o  female MRN: [id number]  Unit/Bed#: L&D 314-01 Encounter: [id number]    Assessment:  Post partum Day #1 s/p , stable    Plan:  1) Continue routine post partum care  Encourage ambulation  Encourage breastfeeding  2) chorioamnionitis- s/p ampicillin & gentamicin 1 dose intrapartum & 1 dose postpartum, afebrile since delivery  3) atony w/o hemorrhage (EBL 300mL)- s/p methergine x1 during delivery, bleeding well controlled, VSS  4) gestational thrombocytopenia-  pre-delivery, pt clinically stable no repeat CBC indicated at this time  5) disposition- anticipate d/c home tomorrow     Subjective/Objective   Chief Complaint:     Post delivery    Subjective:     Pain: yes, cramping, improved with meds  Tolerating PO: yes  Voiding: yes  Flatus: yes  BM: yes  Ambulating: yes  Breastfeeding:  yes  Chest pain: no  Shortness of breath: no  Leg pain: no  Lochia: minimal    Objective:     Vitals: /63 (BP Location: Right arm)   Pulse 71   Temp 97 6 °F (36 4 °C) (Oral)   Resp 18   Ht 5' 8" (1 727 m)   Wt 76 7 kg (169 lb)   LMP 2017 (Exact Date)   SpO2 99%   Breastfeeding?  Yes   BMI 25 70 kg/m²       Intake/Output Summary (Last 24 hours) at 18 0621  Last data filed at 18 0836   Gross per 24 hour   Intake                0 ml   Output              600 ml   Net             -600 ml       Lab Results   Component Value Date    WBC 9 85 04/15/2018    HGB 12 4 04/15/2018    HCT 36 6 04/15/2018    MCV 91 04/15/2018     (L) 04/15/2018       Physical Exam:     Gen: AAOx3, NAD  Abd: Soft, non-tender, non-distended, no rebound or guarding  Uterine fundus firm and non-tender,  -1 cm below the umbilicus  Ext: Non tender

## 2018-04-17 NOTE — LACTATION NOTE
This note was copied from a baby's chart  Mother verbalized breastfeeding is going fairly well  Enc to call for assistance as needed,phone # given

## 2018-04-18 VITALS
OXYGEN SATURATION: 99 % | WEIGHT: 169 LBS | RESPIRATION RATE: 16 BRPM | HEIGHT: 68 IN | SYSTOLIC BLOOD PRESSURE: 102 MMHG | TEMPERATURE: 98.3 F | BODY MASS INDEX: 25.61 KG/M2 | HEART RATE: 87 BPM | DIASTOLIC BLOOD PRESSURE: 67 MMHG

## 2018-04-18 PROCEDURE — 99024 POSTOP FOLLOW-UP VISIT: CPT | Performed by: OBSTETRICS & GYNECOLOGY

## 2018-04-18 RX ORDER — ACETAMINOPHEN 325 MG/1
650 TABLET ORAL EVERY 6 HOURS PRN
Qty: 20 TABLET | Refills: 0
Start: 2018-04-18

## 2018-04-18 RX ORDER — IBUPROFEN 200 MG
600 TABLET ORAL EVERY 6 HOURS PRN
Qty: 30 TABLET | Refills: 0
Start: 2018-04-18

## 2018-04-18 RX ORDER — CALCIUM CARBONATE 200(500)MG
500 TABLET,CHEWABLE ORAL 3 TIMES DAILY PRN
Qty: 20 TABLET | Refills: 0
Start: 2018-04-18

## 2018-04-18 RX ADMIN — IBUPROFEN 600 MG: 600 TABLET ORAL at 06:32

## 2018-04-18 RX ADMIN — DOCUSATE SODIUM 100 MG: 100 CAPSULE, LIQUID FILLED ORAL at 08:36

## 2018-04-18 RX ADMIN — Medication 1 TABLET: at 08:36

## 2018-04-18 NOTE — PROGRESS NOTES
Postpartum Progress Note - OB/GYN   Li Reyes 33 y o  female MRN: [id number]  Unit/Bed#: L&D 568-08 Encounter: [id number]    ASSESSMENT:  Li Reyes 33 y o  Arvel Laundry s/p Spontaneous Vaginal Delivery Postpartum day  2  Pt is well appearing and with no current complaints  PLAN:  1) Continue routine post partum care  Encourage ambulation  Encourage breastfeeding  2) chorioamnionitis- s/p ampicillin & gentamicin 1 dose intrapartum & 1 dose postpartum, afebrile since delivery  3) atony w/o hemorrhage (EBL 300mL)- s/p methergine x1 during delivery, bleeding well controlled, VSS  4) gestational thrombocytopenia-  pre-delivery, pt clinically stable no repeat CBC indicated at this time  5) disposition- anticipate d/c home today    Subjective/Objective     SUBJECTIVE:  Pain: no  Tolerating Oral Intake: yes   Voiding: yes  Flatus: yes  Bowel Movement: no  Ambulating: yes  Breastfeeding: yes  Chest Pain: no  Shortness of Breath: no  Leg Pain/Discomfort: no  Lochia: minimal    OBJECTIVE:     Vitals: Blood pressure 111/62, pulse 74, temperature 98 2 °F (36 8 °C), temperature source Oral, resp  rate 16, height 5' 8" (1 727 m), weight 76 7 kg (169 lb), last menstrual period 07/16/2017, SpO2 99 %, currently breastfeeding       General: appears well, alert and oriented x 3, pleasant and cooperative  Cardiovascular: RRR, normal S1/S2, no GRM  Lungs:  clear to auscultation bilaterally; no WRR, non-labored breathing   Abdomen: normal bowel sounds, soft, no tenderness, no distention  : Uterine fundus firm: -2 cm below the umbilicus  Lower Extremities: Non-tender, peripheral pulses normal; no clubbing, cyanosis, or edema    Labs:   Lab Results   Component Value Date    WBC 9 85 04/15/2018    HGB 12 4 04/15/2018    HCT 36 6 04/15/2018    MCV 91 04/15/2018     (L) 04/15/2018       Medications:   Current Facility-Administered Medications   Medication Dose Route Frequency    acetaminophen (TYLENOL) tablet 650 mg 650 mg Oral Q6H PRN    benzocaine-menthol-lanolin-aloe (DERMOPLAST) 20-0 5 % topical spray   Topical 4x Daily PRN    calcium carbonate (TUMS) chewable tablet 500 mg  500 mg Oral TID PRN    diphenhydrAMINE (BENADRYL) tablet 25 mg  25 mg Oral Q6H PRN    docusate sodium (COLACE) capsule 100 mg  100 mg Oral BID    hydrocortisone 1 % cream 1 application  1 application Topical 4x Daily PRN    ibuprofen (MOTRIN) tablet 600 mg  600 mg Oral Q6H PRN    ondansetron (ZOFRAN) injection 4 mg  4 mg Intravenous Q8H PRN    oxyCODONE-acetaminophen (PERCOCET) 5-325 mg per tablet 1 tablet  1 tablet Oral Q4H PRN    prenatal multivitamin tablet 1 tablet  1 tablet Oral Daily    witch hazel-glycerin (TUCKS) topical pad 1 pad  1 pad Topical PRN     Invasive Devices     Intrauterine Pressure Catheter            Intrauterine Pressure Catheter 04/15/18 1625 2 days                     Uzma Rose MD PGY-2   4/18/2018 6:02 AM

## 2018-04-18 NOTE — LACTATION NOTE
This note was copied from a baby's chart  Breastfeeding discharge booklet given and reviewed with mother  Assisted with positioning and latching  Demo hand expression  Encouraged mother to call for assistance as needed,phone # given  Information given for outpatient services at the [address] including lactation, post partum wellness, support groups and classes

## 2025-01-11 NOTE — LACTATION NOTE
This note was copied from a baby's chart  Assisted mom with breastfeeding  Baby latches well but falls asleep easily at breast and cries if she is taken off nipple  Demo  to mom how to hand express colostrum and how to do the cross cradle hold  Vitals capture started with the following parameters, Patient=Adult, Interval=5 min, Initial Pressure=180 mmHg, Deflation Rate=3 mmHg, Cuff placed on Left Arm